# Patient Record
Sex: MALE | Race: WHITE | Employment: OTHER | URBAN - METROPOLITAN AREA
[De-identification: names, ages, dates, MRNs, and addresses within clinical notes are randomized per-mention and may not be internally consistent; named-entity substitution may affect disease eponyms.]

---

## 2020-11-20 ENCOUNTER — OFFICE VISIT (OUTPATIENT)
Dept: FAMILY MEDICINE | Facility: CLINIC | Age: 65
End: 2020-11-20
Payer: COMMERCIAL

## 2020-11-20 VITALS
RESPIRATION RATE: 16 BRPM | HEIGHT: 72 IN | WEIGHT: 195.8 LBS | DIASTOLIC BLOOD PRESSURE: 61 MMHG | SYSTOLIC BLOOD PRESSURE: 121 MMHG | TEMPERATURE: 98.1 F | BODY MASS INDEX: 26.52 KG/M2 | HEART RATE: 75 BPM | OXYGEN SATURATION: 98 %

## 2020-11-20 DIAGNOSIS — Z00.00 ROUTINE GENERAL MEDICAL EXAMINATION AT A HEALTH CARE FACILITY: Primary | ICD-10-CM

## 2020-11-20 DIAGNOSIS — K21.9 GASTROESOPHAGEAL REFLUX DISEASE WITHOUT ESOPHAGITIS: ICD-10-CM

## 2020-11-20 DIAGNOSIS — E11.9 TYPE 2 DIABETES MELLITUS WITHOUT COMPLICATION, WITH LONG-TERM CURRENT USE OF INSULIN (H): ICD-10-CM

## 2020-11-20 DIAGNOSIS — I10 ESSENTIAL HYPERTENSION: ICD-10-CM

## 2020-11-20 DIAGNOSIS — Z23 NEED FOR TETANUS BOOSTER: ICD-10-CM

## 2020-11-20 DIAGNOSIS — F17.200 TOBACCO DEPENDENCE SYNDROME: ICD-10-CM

## 2020-11-20 DIAGNOSIS — Z12.12 SCREENING FOR COLORECTAL CANCER: ICD-10-CM

## 2020-11-20 DIAGNOSIS — Z87.891 PERSONAL HISTORY OF TOBACCO USE: ICD-10-CM

## 2020-11-20 DIAGNOSIS — Z87.820 HISTORY OF TRAUMATIC BRAIN INJURY: ICD-10-CM

## 2020-11-20 DIAGNOSIS — Z79.4 TYPE 2 DIABETES MELLITUS WITHOUT COMPLICATION, WITH LONG-TERM CURRENT USE OF INSULIN (H): ICD-10-CM

## 2020-11-20 DIAGNOSIS — Z12.11 SCREENING FOR COLORECTAL CANCER: ICD-10-CM

## 2020-11-20 DIAGNOSIS — G40.909 SEIZURE DISORDER (H): ICD-10-CM

## 2020-11-20 LAB
ALBUMIN SERPL-MCNC: 4.3 G/DL (ref 3.4–5)
ALP SERPL-CCNC: 136 U/L (ref 40–150)
ALT SERPL W P-5'-P-CCNC: 51 U/L (ref 0–70)
ANION GAP SERPL CALCULATED.3IONS-SCNC: 1 MMOL/L (ref 3–14)
AST SERPL W P-5'-P-CCNC: 30 U/L (ref 0–45)
BILIRUB SERPL-MCNC: 0.5 MG/DL (ref 0.2–1.3)
BUN SERPL-MCNC: 13 MG/DL (ref 7–30)
CALCIUM SERPL-MCNC: 9.4 MG/DL (ref 8.5–10.1)
CHLORIDE SERPL-SCNC: 106 MMOL/L (ref 94–109)
CHOLEST SERPL-MCNC: 122 MG/DL
CO2 SERPL-SCNC: 32 MMOL/L (ref 20–32)
CREAT SERPL-MCNC: 1.06 MG/DL (ref 0.66–1.25)
GFR SERPL CREATININE-BSD FRML MDRD: 74 ML/MIN/{1.73_M2}
GLUCOSE SERPL-MCNC: 88 MG/DL (ref 70–99)
HBA1C MFR BLD: 5.8 % (ref 0–5.6)
HCV AB SERPL QL IA: NONREACTIVE
HDLC SERPL-MCNC: 21 MG/DL
HIV 1+2 AB+HIV1 P24 AG SERPL QL IA: NONREACTIVE
LDLC SERPL CALC-MCNC: 66 MG/DL
NONHDLC SERPL-MCNC: 101 MG/DL
POTASSIUM SERPL-SCNC: 4 MMOL/L (ref 3.4–5.3)
PROT SERPL-MCNC: 7.8 G/DL (ref 6.8–8.8)
SODIUM SERPL-SCNC: 139 MMOL/L (ref 133–144)
TRIGL SERPL-MCNC: 176 MG/DL

## 2020-11-20 PROCEDURE — 86803 HEPATITIS C AB TEST: CPT | Performed by: PHYSICIAN ASSISTANT

## 2020-11-20 PROCEDURE — 87389 HIV-1 AG W/HIV-1&-2 AB AG IA: CPT | Performed by: PHYSICIAN ASSISTANT

## 2020-11-20 PROCEDURE — 90471 IMMUNIZATION ADMIN: CPT | Performed by: PHYSICIAN ASSISTANT

## 2020-11-20 PROCEDURE — 99000 SPECIMEN HANDLING OFFICE-LAB: CPT | Performed by: PHYSICIAN ASSISTANT

## 2020-11-20 PROCEDURE — G0296 VISIT TO DETERM LDCT ELIG: HCPCS | Performed by: PHYSICIAN ASSISTANT

## 2020-11-20 PROCEDURE — 99386 PREV VISIT NEW AGE 40-64: CPT | Mod: 25 | Performed by: PHYSICIAN ASSISTANT

## 2020-11-20 PROCEDURE — 80053 COMPREHEN METABOLIC PANEL: CPT | Performed by: PHYSICIAN ASSISTANT

## 2020-11-20 PROCEDURE — 36415 COLL VENOUS BLD VENIPUNCTURE: CPT | Performed by: PHYSICIAN ASSISTANT

## 2020-11-20 PROCEDURE — 83036 HEMOGLOBIN GLYCOSYLATED A1C: CPT | Performed by: PHYSICIAN ASSISTANT

## 2020-11-20 PROCEDURE — 99213 OFFICE O/P EST LOW 20 MIN: CPT | Mod: 25 | Performed by: PHYSICIAN ASSISTANT

## 2020-11-20 PROCEDURE — 90715 TDAP VACCINE 7 YRS/> IM: CPT | Performed by: PHYSICIAN ASSISTANT

## 2020-11-20 PROCEDURE — 80175 DRUG SCREEN QUAN LAMOTRIGINE: CPT | Mod: 90 | Performed by: PHYSICIAN ASSISTANT

## 2020-11-20 PROCEDURE — 80061 LIPID PANEL: CPT | Performed by: PHYSICIAN ASSISTANT

## 2020-11-20 PROCEDURE — 80177 DRUG SCRN QUAN LEVETIRACETAM: CPT | Mod: 90 | Performed by: PHYSICIAN ASSISTANT

## 2020-11-20 RX ORDER — GLUCOSAMINE HCL/CHONDROITIN SU 500-400 MG
CAPSULE ORAL
Qty: 100 EACH | Refills: 3 | Status: SHIPPED | OUTPATIENT
Start: 2020-11-20

## 2020-11-20 RX ORDER — LANCETS
EACH MISCELLANEOUS
Qty: 1 EACH | Refills: 3 | Status: SHIPPED | OUTPATIENT
Start: 2020-11-20 | End: 2020-11-20

## 2020-11-20 RX ORDER — ATORVASTATIN CALCIUM 20 MG/1
20 TABLET, FILM COATED ORAL DAILY
COMMUNITY
End: 2020-11-20

## 2020-11-20 RX ORDER — GLUCOSAMINE HCL/CHONDROITIN SU 500-400 MG
CAPSULE ORAL
Qty: 100 EACH | Refills: 3 | Status: SHIPPED | OUTPATIENT
Start: 2020-11-20 | End: 2020-11-20

## 2020-11-20 RX ORDER — INSULIN GLARGINE 100 [IU]/ML
22 INJECTION, SOLUTION SUBCUTANEOUS
COMMUNITY
Start: 2019-05-16 | End: 2020-11-20

## 2020-11-20 RX ORDER — LAMOTRIGINE 300 MG/1
300 TABLET, EXTENDED RELEASE ORAL EVERY MORNING
COMMUNITY
End: 2023-09-27

## 2020-11-20 RX ORDER — INSULIN GLARGINE 100 [IU]/ML
22 INJECTION, SOLUTION SUBCUTANEOUS DAILY
Qty: 3 ML | Refills: 11 | Status: SHIPPED | OUTPATIENT
Start: 2020-11-20 | End: 2021-03-10

## 2020-11-20 RX ORDER — AMLODIPINE BESYLATE 10 MG/1
10 TABLET ORAL
COMMUNITY
Start: 2020-08-27 | End: 2020-11-20

## 2020-11-20 RX ORDER — ATORVASTATIN CALCIUM 20 MG/1
20 TABLET, FILM COATED ORAL DAILY
Qty: 90 TABLET | Refills: 3 | Status: SHIPPED | OUTPATIENT
Start: 2020-11-20 | End: 2021-08-30 | Stop reason: DRUGHIGH

## 2020-11-20 RX ORDER — LEVETIRACETAM 1000 MG/1
1000 TABLET ORAL 2 TIMES DAILY
COMMUNITY
Start: 2020-08-27 | End: 2023-09-27

## 2020-11-20 RX ORDER — LANCETS
EACH MISCELLANEOUS
Qty: 1 EACH | Refills: 3 | Status: SHIPPED | OUTPATIENT
Start: 2020-11-20 | End: 2021-02-15

## 2020-11-20 RX ORDER — AMLODIPINE BESYLATE 10 MG/1
10 TABLET ORAL DAILY
Qty: 90 TABLET | Refills: 3 | Status: SHIPPED | OUTPATIENT
Start: 2020-11-20 | End: 2021-11-22

## 2020-11-20 ASSESSMENT — ENCOUNTER SYMPTOMS
EYE PAIN: 0
DIZZINESS: 0
COUGH: 0
HEMATURIA: 0
CHILLS: 0
CONSTIPATION: 0
DIARRHEA: 0
NERVOUS/ANXIOUS: 0
HEMATOCHEZIA: 0
ABDOMINAL PAIN: 0

## 2020-11-20 ASSESSMENT — MIFFLIN-ST. JEOR: SCORE: 1721.14

## 2020-11-20 NOTE — PATIENT INSTRUCTIONS
To schedule the CT scan of your lungs for lung cancer screening please call [phone]    Lung Cancer Screening   Frequently Asked Questions  If you are at high-risk for lung cancer, getting screened with low-dose computed tomography (LDCT) every year can help save your life. This handout offers answers to some of the most common questions about lung cancer screening. If you have other questions, please call [phone] ([phone]).     What is it?  Lung cancer screening uses special X-ray technology to create an image of your lung tissue. The exam is quick and easy and takes less than 10 seconds. We don t give you any medicine or use any needles. You can eat before and after the exam. You don t need to change your clothes as long as the clothing on your chest doesn t contain metal. But, you do need to be able to hold your breath for at least 6 seconds during the exam.    What is the goal of lung cancer screening?  The goal of lung cancer screening is to save lives. Many times, lung cancer is not found until a person starts having physical symptoms. Lung cancer screening can help detect lung cancer in the earliest stages when it may be easier to treat.    Who should be screened for lung cancer?  We suggest lung cancer screening for anyone who is at high-risk for lung cancer. You are in the high-risk group if you:      are between the ages of 55 and 79, and    have smoked at least 1 pack of cigarettes a day for 30 or more years, and    still smoke or have quit within the past 15 years.    However, if you have a new cough or shortness of breath, you should talk to your doctor before being screened.    Some national lung health advocacy groups also recommend screening for people ages 50 to 79 who have smoked an average of 1 pack of cigarettes a day for 20 years. They must also have at least 1 other risk factor for lung cancer, not including exposure to secondhand smoke. Other risk factors are having had  cancer in the past, emphysema, pulmonary fibrosis, COPD, a family history of lung cancer, or exposure to certain materials such as arsenic, asbestos, beryllium, cadmium, chromium, diesel fumes, nickel, radon or silica. Your care team can help you know if you have one of these risk factors.     Why does it matter if I have symptoms?  Certain symptoms can be a sign that you have a condition in your lungs that should be checked and treated by your doctor. These symptoms include fever, chest pain, a new or changing cough, shortness of breath that you have never felt before, coughing up blood or unexplained weight loss. Having any of these symptoms can greatly affect the results of lung cancer screening.       Should all smokers get an LDCT lung cancer screening exam?  It depends. Lung cancer screening is for a very specific group of men and women who have a history of heavy smoking over a long period of time (see  Who should be screened for lung cancer  above).  I am in the high-risk group, but have been diagnosed with cancer in the past. Is LDCT lung cancer screening right for me?  In some cases, you should not have LDCT lung screening, such as when your doctor is already following your cancer with CT scan studies. Your doctor will help you decide if LDCT lung screening is right for you.  Do I need to have a screening exam every year?  Yes. If you are in the high-risk group described earlier, you should get an LDCT lung cancer screening exam every year until you are 79, or are no longer willing or able to undergo screening and possible procedures to diagnose and treat lung cancer.  How effective is LDCT at preventing death from lung cancer?  Studies have shown that LDCT lung cancer screening can lower the risk of death from lung cancer by 20 percent in people who are at high-risk.  What are the risks?  There are some risks and limitations of LDCT lung cancer screening. We want to make sure you understand the risks and  benefits, so please let us know if you have any questions. Your doctor may want to talk with you more about these risks.    Radiation exposure: As with any exam that uses radiation, there is a very small increased risk of cancer. The amount of radiation in LDCT is small--about the same amount a person would get from a mammogram. Your doctor orders the exam when he or she feels the potential benefits outweigh the risks.    False negatives: No test is perfect, including LDCT. It is possible that you may have a medical condition, including lung cancer, that is not found during your exam. This is called a false negative result.    False positives and more testing: LDCT very often finds something in the lung that could be cancer, but in fact is not. This is called a false positive result. False positive tests often cause anxiety. To make sure these findings are not cancer, you may need to have more tests. These tests will be done only if you give us permission. Sometimes patients need a treatment that can have side effects, such as a biopsy. For more information on false positives, see  What can I expect from the results?     Findings not related to lung cancer: Your LDCT exam also takes pictures of areas of your body next to your lungs. In a very small number of cases, the CT scan will show an abnormal finding in one of these areas, such as your kidneys, adrenal glands, liver or thyroid. This finding may not be serious, but you may need more tests. Your doctor can help you decide what other tests you may need, if any.  What can I expect from the results?  About 1 out of 4 LDCT exams will find something that may need more tests. Most of the time, these findings are lung nodules. Lung nodules are very small collections of tissue in the lung. These nodules are very common, and the vast majority--more than 97 percent--are not cancer (benign). Most are normal lymph nodes or small areas of scarring from past infections.  But,  if a small lung nodule is found to be cancer, the cancer can be cured more than 90 percent of the time. To know if the nodule is cancer, we may need to get more images before your next yearly screening exam. If the nodule has suspicious features (for example, it is large, has an odd shape or grows over time), we will refer you to a specialist for further testing.  Will my doctor also get the results?  Yes. Your doctor will get a copy of your results.  Is it okay to keep smoking now that there s a cancer screening exam?  No. Tobacco is one of the strongest cancer-causing agents. It causes not only lung cancer, but other cancers and cardiovascular (heart) diseases as well. The damage caused by smoking builds over time. This means that the longer you smoke, the higher your risk of disease. While it is never too late to quit, the sooner you quit, the better.  Where can I find help to quit smoking?  The best way to prevent lung cancer is to stop smoking. If you have already quit smoking, congratulations and keep it up! For help on quitting smoking, please call [employer] at 0-555-881-MEOB ([phone] or the American Cancer Society at 1-484.133.3692 to find local resources near you.  One-on-one health coaching:  If you d prefer to work individually with a health care provider on tobacco cessation, we offer:      Medication Therapy Management:  Our specially trained pharmacists work closely with you and your doctor to help you quit smoking.  Call [phone] or [phone] (toll free).     Can Do: Health coaching offered by [facility].  [email or web address]

## 2020-11-20 NOTE — LETTER
November 23, 2020      Karlo Townsend  [address] [address]        Dear ElbaCary,    We are writing to inform you of your test results.    Nice visiting with you this past week.   Your Keppra and Lamotrigine levels are normal.   Routine HIV and hepatitis c screening tests returned normal as well.   Your HDL (good) cholesterol is slightly low. Improve this with aerobic exercise and diet. Your triglycerides are moderately elevated. To keep triglycerides in check, watch sugar, juice, excess fruit, bread, pasta, rice and cereal. Increase exercise. Continue taking your statin (cholesterol lowering) medication as you have been.   Your comprehensive metabolic panel (kidney function, liver function, glucose, electrolytes) returned normal.   Hemoglobin A1c is at goal at 5.8%.   Please be in touch with any questions.     Resulted Orders   Hemoglobin A1c   Result Value Ref Range    Hemoglobin A1C 5.8 (H) 0 - 5.6 %      Comment:      Normal <5.7% Prediabetes 5.7-6.4%  Diabetes 6.5% or higher - adopted from ADA   consensus guidelines.     Comprehensive metabolic panel (BMP + Alb, Alk Phos, ALT, AST, Total. Bili, TP)   Result Value Ref Range    Sodium 139 133 - 144 mmol/L    Potassium 4.0 3.4 - 5.3 mmol/L    Chloride 106 94 - 109 mmol/L    Carbon Dioxide 32 20 - 32 mmol/L    Anion Gap 1 (L) 3 - 14 mmol/L    Glucose 88 70 - 99 mg/dL      Comment:      Fasting specimen    Urea Nitrogen 13 7 - 30 mg/dL    Creatinine 1.06 0.66 - 1.25 mg/dL    GFR Estimate 74 >60 mL/min/[1.73_m2]      Comment:      Non  GFR Calc  Starting 12/18/2018, serum creatinine based estimated GFR (eGFR) will be   calculated using the Chronic Kidney Disease Epidemiology Collaboration   (CKD-EPI) equation.      GFR Estimate If Black 86 >60 mL/min/[1.73_m2]      Comment:       GFR Calc  Starting 12/18/2018, serum creatinine based estimated GFR (eGFR) will be   calculated using the Chronic Kidney Disease  Epidemiology Collaboration   (CKD-EPI) equation.      Calcium 9.4 8.5 - 10.1 mg/dL    Bilirubin Total 0.5 0.2 - 1.3 mg/dL    Albumin 4.3 3.4 - 5.0 g/dL    Protein Total 7.8 6.8 - 8.8 g/dL    Alkaline Phosphatase 136 40 - 150 U/L    ALT 51 0 - 70 U/L    AST 30 0 - 45 U/L   Lipid panel reflex to direct LDL Fasting   Result Value Ref Range    Cholesterol 122 <200 mg/dL    Triglycerides 176 (H) <150 mg/dL      Comment:      Borderline high:  150-199 mg/dl  High:             200-499 mg/dl  Very high:       >499 mg/dl  Fasting specimen      HDL Cholesterol 21 (L) >39 mg/dL    LDL Cholesterol Calculated 66 <100 mg/dL      Comment:      Desirable:       <100 mg/dl    Non HDL Cholesterol 101 <130 mg/dL   Keppra (Levetiracetam) Level   Result Value Ref Range    Keppra (Levetiracetam) Level 38 12 - 46 ug/mL      Comment:      (Note)  INTERPRETIVE INFORMATION: Keppra (Levetiracetam)  Therapeutic Range:  12-46 ug/mL             Toxic:  Not well Established  Pharmacokinetics of levetiracetam are affected by renal   function. Adverse effects may include somnolence, weakness,   headache and vomiting.  This levetiracetam (Keppra) immunoassay uses the [email or web address] reagents, which has known cross-reactivity with   the drug brivaracetam (Briviact) and may report inaccurate   results. Patients transitioning from levetiracetam to   brivaracetam or those who are using both medications should   not monitor drug concentrations with the [employer]   assay. These patients should be monitored using a validated   chromatographic methodology that distinguishes between   drugs to determine drug concentrations.  Performed By: [facility]  [address] [place] [id number]  : Mindy Nagel MD     Lamotrigine Level   Result Value Ref Range    Lamotrigine Level 11.9 2.5 - 15.0 ug/mL      Comment:      (Note)  INTERPRETIVE INFORMATION:  Lamotrigine  Therapeutic Range:  2.5-15.0 ug/mL             Toxic:   Not well established  Pharmacokinetics varies widely, particularly with   co-medications and/or compromised renal function.  Adverse   effects may include dizziness, somnolence, nausea and   vomiting.  Performed By: [facility]  [address] [place] [id number]  : Mindy Nagel MD     Hepatitis C antibody   Result Value Ref Range    Hepatitis C Antibody Nonreactive NR^Nonreactive      Comment:      Assay performance characteristics have not been established for newborns,   infants, and children     HIV Antigen Antibody Combo   Result Value Ref Range    HIV Antigen Antibody Combo Nonreactive NR^Nonreactive          Comment:      HIV-1 p24 Ag & HIV-1/HIV-2 Ab Not Detected     If you have any questions or concerns, please call the clinic at the number listed above.     Sincerely,      Sagar Paris PA-C/nr

## 2020-11-21 LAB
LAMOTRIGINE SERPL-MCNC: 11.9 UG/ML (ref 2.5–15)
LEVETIRACETAM SERPL-MCNC: 38 UG/ML (ref 12–46)

## 2020-12-07 DIAGNOSIS — Z12.12 SCREENING FOR COLORECTAL CANCER: ICD-10-CM

## 2020-12-07 DIAGNOSIS — Z12.11 SCREENING FOR COLORECTAL CANCER: ICD-10-CM

## 2020-12-07 PROCEDURE — 82274 ASSAY TEST FOR BLOOD FECAL: CPT | Performed by: PHYSICIAN ASSISTANT

## 2020-12-08 LAB — HEMOCCULT STL QL IA: NEGATIVE

## 2021-02-05 ENCOUNTER — ANCILLARY PROCEDURE (OUTPATIENT)
Dept: CT IMAGING | Facility: CLINIC | Age: 66
End: 2021-02-05
Attending: PHYSICIAN ASSISTANT
Payer: COMMERCIAL

## 2021-02-05 DIAGNOSIS — F17.200 TOBACCO DEPENDENCE SYNDROME: ICD-10-CM

## 2021-02-05 PROCEDURE — 71271 CT THORAX LUNG CANCER SCR C-: CPT | Mod: GC | Performed by: RADIOLOGY

## 2021-02-08 ENCOUNTER — TELEPHONE (OUTPATIENT)
Dept: FAMILY MEDICINE | Facility: CLINIC | Age: 66
End: 2021-02-08

## 2021-02-08 NOTE — TELEPHONE ENCOUNTER
Radiology called with incidental finding of chest CT from 2/5/21:    Impression:   1. ACR Assessment Category (v1.1):  Lung-RADS Category 2. Benign  appearance or behavior.       Recommendation:  Lung-RADS Category 2. Benign appearance or behavior.  Recommendation:  continue annual screening with Lung cancer screening  CT (please order exam code [id number]).         2. Significant Incidental Finding(s):  Category S: Yes.  a.  Moderate coronary artery calcification.  3. Avoidance of tobacco smoke is strongly advised. Please consider  referral for smoking cessation to [facility] Medication Therapy Management  (MTM) if clinically appropriate.

## 2021-02-09 NOTE — TELEPHONE ENCOUNTER
Thanks for relaying the message.  I will send the patient a letter with the details and follow up recommendations.   It looks like we have an appointment coming up as well.  He was not interested in smoking cessation at last OV. Will [place] back to that topic at upcoming visit. The results of the scan may provide some additional motivation.  Thanks for your help  Sagar Paris

## 2021-02-10 ENCOUNTER — TELEPHONE (OUTPATIENT)
Dept: FAMILY MEDICINE | Facility: CLINIC | Age: 66
End: 2021-02-10

## 2021-02-10 DIAGNOSIS — E11.9 TYPE 2 DIABETES MELLITUS WITHOUT COMPLICATION, WITH LONG-TERM CURRENT USE OF INSULIN (H): ICD-10-CM

## 2021-02-10 DIAGNOSIS — Z79.4 TYPE 2 DIABETES MELLITUS WITHOUT COMPLICATION, WITH LONG-TERM CURRENT USE OF INSULIN (H): ICD-10-CM

## 2021-02-15 RX ORDER — LANCETS
EACH MISCELLANEOUS
Qty: 1 EACH | Refills: 3 | Status: SHIPPED | OUTPATIENT
Start: 2021-02-15 | End: 2021-04-30

## 2021-02-15 NOTE — TELEPHONE ENCOUNTER
[facility] does not provide such supplies. Please send rx to [facility] [address]. Needding test strips and lancets please.      [facility] - [place], [place] - [address]    Thanks  Macrina LINO

## 2021-02-19 ENCOUNTER — OFFICE VISIT (OUTPATIENT)
Dept: FAMILY MEDICINE | Facility: CLINIC | Age: 66
End: 2021-02-19
Payer: COMMERCIAL

## 2021-02-19 VITALS
SYSTOLIC BLOOD PRESSURE: 112 MMHG | BODY MASS INDEX: 26.99 KG/M2 | HEART RATE: 82 BPM | DIASTOLIC BLOOD PRESSURE: 62 MMHG | OXYGEN SATURATION: 95 % | TEMPERATURE: 98.1 F | RESPIRATION RATE: 16 BRPM | WEIGHT: 199 LBS

## 2021-02-19 DIAGNOSIS — Z79.4 TYPE 2 DIABETES MELLITUS WITHOUT COMPLICATION, WITH LONG-TERM CURRENT USE OF INSULIN (H): Primary | ICD-10-CM

## 2021-02-19 DIAGNOSIS — Z12.5 SPECIAL SCREENING FOR MALIGNANT NEOPLASM OF PROSTATE: ICD-10-CM

## 2021-02-19 DIAGNOSIS — E11.9 TYPE 2 DIABETES MELLITUS WITHOUT COMPLICATION, WITH LONG-TERM CURRENT USE OF INSULIN (H): Primary | ICD-10-CM

## 2021-02-19 LAB
HBA1C MFR BLD: 5.8 % (ref 0–5.6)
PSA SERPL-ACNC: 1.32 UG/L (ref 0–4)

## 2021-02-19 PROCEDURE — 83036 HEMOGLOBIN GLYCOSYLATED A1C: CPT | Performed by: PHYSICIAN ASSISTANT

## 2021-02-19 PROCEDURE — 36415 COLL VENOUS BLD VENIPUNCTURE: CPT | Performed by: PHYSICIAN ASSISTANT

## 2021-02-19 PROCEDURE — G0103 PSA SCREENING: HCPCS | Performed by: PHYSICIAN ASSISTANT

## 2021-02-19 PROCEDURE — 82043 UR ALBUMIN QUANTITATIVE: CPT | Performed by: PHYSICIAN ASSISTANT

## 2021-02-19 PROCEDURE — 99207 PR FOOT EXAM NO CHARGE: CPT | Performed by: PHYSICIAN ASSISTANT

## 2021-02-19 PROCEDURE — 99215 OFFICE O/P EST HI 40 MIN: CPT | Performed by: PHYSICIAN ASSISTANT

## 2021-02-19 RX ORDER — ATORVASTATIN CALCIUM 40 MG/1
40 TABLET, FILM COATED ORAL DAILY
Qty: 90 TABLET | Refills: 3 | Status: SHIPPED | OUTPATIENT
Start: 2021-02-19 | End: 2021-11-22

## 2021-02-19 NOTE — LETTER
March 1, 2021      Karlo Townsend  [address] [address]        Zohra Mcpherson visiting with you recently.   Let's review your lab work:   - Hemoglobin A1c is stable and at goal of less than 6.5%. Keep up the good work. I hope the lower dose of metformin helps with your GI symptoms.   - PSA (prosate cancer screening test) returned normal.   - Urine protein test also normal.   Please be in touch with any questions.     Resulted Orders   HEMOGLOBIN A1C   Result Value Ref Range    Hemoglobin A1C 5.8 (H) 0 - 5.6 %      Comment:      Normal <5.7% Prediabetes 5.7-6.4%  Diabetes 6.5% or higher - adopted from ADA   consensus guidelines.     Albumin Random Urine Quantitative with Creat Ratio   Result Value Ref Range    Creatinine Urine 124 mg/dL    Albumin Urine mg/L 14 mg/L    Albumin Urine mg/g Cr 10.97 0 - 17 mg/g Cr   PSA, screen   Result Value Ref Range    PSA 1.32 0 - 4 ug/L      Comment:      Assay Method:  Chemiluminescence using Siemens Vista analyzer     Sincerely,      Sagar Paris PA-C

## 2021-02-19 NOTE — PROGRESS NOTES
Assessment & Plan     Type 2 diabetes mellitus without complication, with long-term current use of insulin (H)  - Increase dose of statin to 40 mg daily given CT lung cancer screening showed moderate amount of coronary calcifications. Given GI symptoms with metformin will lower dose to 500 mg BID. His blood sugar control has been excellent.   - HEMOGLOBIN A1C  - atorvastatin (LIPITOR) 40 MG tablet; Take 1 tablet (40 mg) by mouth daily  - metFORMIN (GLUCOPHAGE) 500 MG tablet; Take 1 tablet (500 mg) by mouth 2 times daily (with meals)  - Albumin Random Urine Quantitative with Creat Ratio  - FOOT EXAM    Special screening for malignant neoplasm of prostate  - PSA, screen      40 minutes spent on the date of the encounter doing chart review, history and exam, documentation and further activities as noted above    Return in about 6 months (around 8/19/2021) for Physical Exam, Lab Work, Routine Visit.    Sagar Paris PA-C  [facility]    Natalia Dietrich is a 65 year old who presents for the following health issues     HPI   CT lung cancer screening follow up:   CT scan showed moderate coronary artery calcifications. 4 mm solid nodule RUL and trace emphysematous changes at lung apices. He is not interested in smoking cessation. Smokes 1/2 PPD.     Other: A1c has been at goal. Last checked 11/20/2020 returned 5.8%. Has been noticing some diarrhea he attributes to metformin. Wondering about dose adjustment.     Review of Systems   Constitutional, HEENT, cardiovascular, pulmonary, gi and gu systems are negative, except as otherwise noted.      Objective    /62   Pulse 82   Temp 98.1  F (36.7  C) (Oral)   Resp 16   Wt 90.3 kg (199 lb)   SpO2 95%   BMI 26.99 kg/m    Body mass index is 26.99 kg/m .  Physical Exam  Vitals signs and nursing note reviewed.   Constitutional:       Appearance: Normal appearance.   HENT:      Head: Normocephalic and atraumatic.   Eyes:       Conjunctiva/sclera: Conjunctivae normal.   Cardiovascular:      Rate and Rhythm: Normal rate and regular rhythm.      Heart sounds: Normal heart sounds.   Pulmonary:      Effort: Pulmonary effort is normal.   Feet:      Right foot:      Protective Sensation: 8 sites tested. 6 sites sensed.      Skin integrity: Skin integrity normal.      Toenail Condition: Fungal disease present.     Left foot:      Protective Sensation: 8 sites tested. 4 sites sensed.      Skin integrity: Skin integrity normal.      Toenail Condition: Fungal disease present.  Neurological:      General: No focal deficit present.      Mental Status: He is alert.   Psychiatric:         Mood and Affect: Mood normal.            Results for orders placed or performed in visit on 02/19/21 (from the past 24 hour(s))   HEMOGLOBIN A1C   Result Value Ref Range    Hemoglobin A1C 5.8 (H) 0 - 5.6 %

## 2021-02-20 LAB
CREAT UR-MCNC: 124 MG/DL
MICROALBUMIN UR-MCNC: 14 MG/L
MICROALBUMIN/CREAT UR: 10.97 MG/G CR (ref 0–17)

## 2021-04-30 DIAGNOSIS — Z79.4 TYPE 2 DIABETES MELLITUS WITHOUT COMPLICATION, WITH LONG-TERM CURRENT USE OF INSULIN (H): ICD-10-CM

## 2021-04-30 DIAGNOSIS — E11.9 TYPE 2 DIABETES MELLITUS WITHOUT COMPLICATION, WITH LONG-TERM CURRENT USE OF INSULIN (H): ICD-10-CM

## 2021-04-30 RX ORDER — LANCETS
EACH MISCELLANEOUS
Qty: 100 EACH | Refills: 3 | Status: SHIPPED | OUTPATIENT
Start: 2021-04-30 | End: 2022-07-26

## 2021-04-30 NOTE — TELEPHONE ENCOUNTER
Reason for Call:  Medication or medication refill:    Do you use a [facility]?  Name of the pharmacy and phone number for the current request:  [id number] [address]    Name of the medication requested: thin (NO BRAND SPECIFIED) lancets    Other request: Patient is requesting a refill on this medication. Please assist. Thanks!    Can we leave a detailed message on this number? YES    Phone number patient can be reached at: Cell number on file:    Telephone Information:   Mobile [phone]     Best Time: Any    Call taken on 4/30/2021 at 11:21 AM by Abbey Devine

## 2021-05-05 DIAGNOSIS — E11.9 TYPE 2 DIABETES MELLITUS WITHOUT COMPLICATION, WITH LONG-TERM CURRENT USE OF INSULIN (H): Primary | ICD-10-CM

## 2021-05-05 DIAGNOSIS — Z79.4 TYPE 2 DIABETES MELLITUS WITHOUT COMPLICATION, WITH LONG-TERM CURRENT USE OF INSULIN (H): Primary | ICD-10-CM

## 2021-07-16 DIAGNOSIS — E11.9 TYPE 2 DIABETES MELLITUS WITHOUT COMPLICATION, WITH LONG-TERM CURRENT USE OF INSULIN (H): ICD-10-CM

## 2021-07-16 DIAGNOSIS — Z79.4 TYPE 2 DIABETES MELLITUS WITHOUT COMPLICATION, WITH LONG-TERM CURRENT USE OF INSULIN (H): ICD-10-CM

## 2021-07-21 RX ORDER — FLURBIPROFEN SODIUM 0.3 MG/ML
SOLUTION/ DROPS OPHTHALMIC
Qty: 100 EACH | Refills: 1 | Status: SHIPPED | OUTPATIENT
Start: 2021-07-21 | End: 2022-02-10

## 2021-07-21 NOTE — TELEPHONE ENCOUNTER
Diabetic Supplies Protocol [id number]/16/2021 09:12 AM   Medication is active on med list Protocol Details    Patient is 18 years of age or older     Recent (6 mo) or future (30 days) visit within the authorizing provider's specialty

## 2021-08-30 ENCOUNTER — OFFICE VISIT (OUTPATIENT)
Dept: FAMILY MEDICINE | Facility: CLINIC | Age: 66
End: 2021-08-30
Payer: COMMERCIAL

## 2021-08-30 VITALS
HEIGHT: 73 IN | OXYGEN SATURATION: 97 % | WEIGHT: 193 LBS | TEMPERATURE: 97.1 F | HEART RATE: 67 BPM | RESPIRATION RATE: 16 BRPM | BODY MASS INDEX: 25.58 KG/M2 | DIASTOLIC BLOOD PRESSURE: 70 MMHG | SYSTOLIC BLOOD PRESSURE: 132 MMHG

## 2021-08-30 DIAGNOSIS — Z79.4 TYPE 2 DIABETES MELLITUS WITHOUT COMPLICATION, WITH LONG-TERM CURRENT USE OF INSULIN (H): Primary | ICD-10-CM

## 2021-08-30 DIAGNOSIS — E11.9 TYPE 2 DIABETES MELLITUS WITHOUT COMPLICATION, WITH LONG-TERM CURRENT USE OF INSULIN (H): Primary | ICD-10-CM

## 2021-08-30 LAB — HBA1C MFR BLD: 5.9 % (ref 0–5.6)

## 2021-08-30 PROCEDURE — 83036 HEMOGLOBIN GLYCOSYLATED A1C: CPT | Performed by: PHYSICIAN ASSISTANT

## 2021-08-30 PROCEDURE — 36415 COLL VENOUS BLD VENIPUNCTURE: CPT | Performed by: PHYSICIAN ASSISTANT

## 2021-08-30 PROCEDURE — 99213 OFFICE O/P EST LOW 20 MIN: CPT | Performed by: PHYSICIAN ASSISTANT

## 2021-08-30 ASSESSMENT — MIFFLIN-ST. JEOR: SCORE: 1719.32

## 2021-08-30 NOTE — LETTER
August 30, 2021      Karlo Townsend  [address] [address]        Dear ,    We are writing to inform you of your test results.    Your A1c level is stable at 5.9%.   Please continue to work on healthy diet and regular exercise.   If you decide you want to quit smoking please let us know.   Follow up as planned with Dr. Nguyen later this year.   Thanks for being a wonderful patient.   Best wishes,   Sagar Bynum Orders   Hemoglobin A1c   Result Value Ref Range    Hemoglobin A1C 5.9 (H) 0.0 - 5.6 %      Comment:      Normal <5.7%   Prediabetes 5.7-6.4%    Diabetes 6.5% or higher     Note: Adopted from ADA consensus guidelines.       If you have any questions or concerns, please call the clinic at the number listed above.       Sincerely,      Sagar Paris PA-C

## 2021-08-30 NOTE — PROGRESS NOTES
"    Assessment & Plan     Type 2 diabetes mellitus without complication, with long-term current use of insulin (H)  Chronic, stable, at goal. He is bothered by GI side effects with metformin. Requesting discontinuation of the medication today. He continues to use Lantus 22 units daily. A1c today 5.9%. Will oblige request and stop Metformin. We did discuss switching to XR form but he would like to try stopping entirely. He is to follow up with Dr. Nguyen in 3 months for recheck as I will be transitioning out of primary care. Discussed need for eye exam -- he is due. He will follow up on this. Also discussed smoking cessation but Karlo is not interested in this at this time. He understands increased risk for adverse events such as heart disease and stroke. Blood pressure is well controlled today. Encouraged healthy diet and regular exercise.   - Adult Eye Referral; Future  - Hemoglobin A1c; Future  - Hemoglobin A1c       Tobacco Cessation:   reports that he has been smoking. He has never used smokeless tobacco.  Tobacco Cessation Action Plan: Information offered: Patient not interested at this time    BMI:   Estimated body mass index is 25.46 kg/m  as calculated from the following:    Height as of this encounter: 1.854 m (6' 1\").    Weight as of this encounter: 87.5 kg (193 lb).   Weight management plan: Discussed healthy diet and exercise guidelines    Return in about 3 months (around 11/30/2021) for Routine Visit.    Sagar Paris PA-C  [facility]    Natalia Dietrich is a 66 year old who presents for the following health issues     HPI     Diabetes Follow-up  How often are you checking your blood sugar? One time daily  What time of day are you checking your blood sugars (select all that apply)?  Before meals  Have you had any blood sugars above 200?  No  Have you had any blood sugars below 70?  No    What symptoms do you notice when your blood sugar is low?  None and Not " "applicable    What concerns do you have today about your diabetes? None and Other: medication      Do you have any of these symptoms? (Select all that apply)  No numbness or tingling in feet.  No redness, sores or blisters on feet.  No complaints of excessive thirst.  No reports of blurry vision.  No significant changes to weight.    Have you had a diabetic eye exam in the last 12 months? No    Metformin - decreased dose - helped a little bit but still with loose stools. Less frequent now with lower dose but still bothersome.     Exercise - going for walks, goes up and down stairs a lot. Lives on top floor of building.   Reports has been maintaining weight.   Diet limits sugar - meals are cooked for him - continues with prilosec and tries to avoid spicy and greasy foods.     Lantus 22 once daily   Checking sugars in the morning typically 100-110   No low sugar readings     1/2 PPD smoker     Eye exam - declines     BP Readings from Last 2 Encounters:   08/30/21 132/70   02/19/21 112/62     Hemoglobin A1C (%)   Date Value   08/30/2021 5.9 (H)   02/19/2021 5.8 (H)   11/20/2020 5.8 (H)     LDL Cholesterol Calculated (mg/dL)   Date Value   11/20/2020 66           How many servings of fruits and vegetables do you eat daily?  0-1    On average, how many sweetened beverages do you drink each day (Examples: soda, juice, sweet tea, etc.  Do NOT count diet or artificially sweetened beverages)?   0    How many days per week do you exercise enough to make your heart beat faster? 7    How many minutes a day do you exercise enough to make your heart beat faster? 20 - 29    How many days per week do you miss taking your medication? 0        Review of Systems   Constitutional, HEENT, cardiovascular, pulmonary, gi and gu systems are negative, except as otherwise noted.      Objective    /70   Pulse 67   Temp 97.1  F (36.2  C) (Oral)   Resp 16   Ht 1.854 m (6' 1\")   Wt 87.5 kg (193 lb)   SpO2 97%   BMI 25.46 kg/m    Body " mass index is 25.46 kg/m .  Physical Exam       Results for orders placed or performed in visit on 08/30/21   Hemoglobin A1c     Status: Abnormal   Result Value Ref Range    Hemoglobin A1C 5.9 (H) 0.0 - 5.6 %

## 2021-08-30 NOTE — LETTER
August 31, 2021      Karlo Townsend  [address] [facility] [address]        Hi Karlo,     Your A1c level is stable at 5.9%.   Please continue to work on healthy diet and regular exercise.   If you decide you want to quit smoking please let us know.   Follow up as planned with Dr. Nguyen later this year.     Resulted Orders   Hemoglobin A1c   Result Value Ref Range    Hemoglobin A1C 5.9 (H) 0.0 - 5.6 %      Comment:      Normal <5.7%   Prediabetes 5.7-6.4%    Diabetes 6.5% or higher     Note: Adopted from ADA consensus guidelines.       Thanks for being a wonderful patient.     Best wishes,   Sagar Paris

## 2021-09-23 ENCOUNTER — OFFICE VISIT (OUTPATIENT)
Dept: OPHTHALMOLOGY | Facility: CLINIC | Age: 66
End: 2021-09-23
Attending: PHYSICIAN ASSISTANT
Payer: COMMERCIAL

## 2021-09-23 DIAGNOSIS — H52.7 REFRACTIVE ERROR: ICD-10-CM

## 2021-09-23 DIAGNOSIS — H25.13 NUCLEAR SCLEROTIC CATARACT OF BOTH EYES: ICD-10-CM

## 2021-09-23 DIAGNOSIS — E11.9 TYPE 2 DIABETES MELLITUS WITHOUT COMPLICATION, WITH LONG-TERM CURRENT USE OF INSULIN (H): Primary | ICD-10-CM

## 2021-09-23 DIAGNOSIS — Z79.4 TYPE 2 DIABETES MELLITUS WITHOUT COMPLICATION, WITH LONG-TERM CURRENT USE OF INSULIN (H): Primary | ICD-10-CM

## 2021-09-23 PROCEDURE — G0463 HOSPITAL OUTPT CLINIC VISIT: HCPCS | Mod: 25

## 2021-09-23 PROCEDURE — 92015 DETERMINE REFRACTIVE STATE: CPT

## 2021-09-23 PROCEDURE — 92004 COMPRE OPH EXAM NEW PT 1/>: CPT | Performed by: OPHTHALMOLOGY

## 2021-09-23 ASSESSMENT — CONF VISUAL FIELD
METHOD: COUNTING FINGERS
OS_NORMAL: 1
OD_NORMAL: 1

## 2021-09-23 ASSESSMENT — VISUAL ACUITY
METHOD_MR_RETINOSCOPY: 1
OS_PH_SC: 20/30
OD_PH_SC: 20/60
OS_SC: 20/40
OD_SC: 20/70
METHOD: SNELLEN - LINEAR

## 2021-09-23 ASSESSMENT — REFRACTION_MANIFEST
OS_AXIS: 007
OS_CYLINDER: +0.75
OD_AXIS: 175
OD_CYLINDER: +1.00
OD_ADD: +2.50
OS_ADD: +2.50
OS_SPHERE: PLANO
OD_SPHERE: -0.75

## 2021-09-23 ASSESSMENT — SLIT LAMP EXAM - LIDS
COMMENTS: DERMATOCHALASIS
COMMENTS: DERMATOCHALASIS

## 2021-09-23 ASSESSMENT — EXTERNAL EXAM - LEFT EYE: OS_EXAM: NORMAL

## 2021-09-23 ASSESSMENT — CUP TO DISC RATIO
OD_RATIO: 0.2
OS_RATIO: 0.2

## 2021-09-23 ASSESSMENT — TONOMETRY
IOP_METHOD: TONOPEN
OD_IOP_MMHG: 13
OS_IOP_MMHG: 13

## 2021-09-23 ASSESSMENT — EXTERNAL EXAM - RIGHT EYE: OD_EXAM: NORMAL

## 2021-09-23 NOTE — PROGRESS NOTES
Chief Complaint(s) and History of Present Illness(es)     Diabetic Eye Exam Follow Up     Associated symptoms: Negative for photophobia, floaters, flashes and   tearing    Diabetes Type: Type 2    Blood Sugars: is controlled    Treatments tried: no treatments    Pain scale: 0/10    Comments: Type 2 diabetes mellitus without complication, with long-term   current use of insulin               Comments     Pt here for Diabetic eye exam  LBS:  83    Last A1C: 5.9  Lab Results       Component                Value               Date                       A1C                      5.9                 08/30/2021                 A1C                      5.8                 02/19/2021                 A1C                      5.8                 11/20/2020       Lexi Alfred COT 9:03 AM September 23, 2021                        Review of systems for the eyes was negative other than the pertinent positives/negatives listed in the HPI.      Assessment & Plan      Karlo Townsend is a 66 year old male with the following diagnoses:   1. Type 2 diabetes mellitus without complication, with long-term current use of insulin (H)    2. Nuclear sclerotic cataract of both eyes    3. Refractive error           New patient here with aid from nursing home  Well controlled diabetes mellitus   No retinopathy  Stressed good glycemic and hypertensive control  Monitor yearly     Refractive options reviewed  Refraction given  Option to fill       Patient disposition:   Return in about 1 year (around 9/23/2022) for DFE, optometry clinic.           Attending Physician Attestation:  Complete documentation of historical and exam elements from today's encounter can be found in the full encounter summary report (not reduplicated in this progress note).  I personally obtained the chief complaint(s) and history of present illness.  I confirmed and edited as necessary the review of systems, past medical/surgical history, family history, social history,  and examination findings as documented by others; and I examined the patient myself.  I personally reviewed the relevant tests, images, and reports as documented above.  I formulated and edited as necessary the assessment and plan and discussed the findings and management plan with the patient and family. . - Tigre Alfred MD

## 2021-10-14 ENCOUNTER — TELEPHONE (OUTPATIENT)
Dept: FAMILY MEDICINE | Facility: CLINIC | Age: 66
End: 2021-10-14

## 2021-10-14 DIAGNOSIS — E11.9 TYPE 2 DIABETES MELLITUS WITHOUT COMPLICATION, WITH LONG-TERM CURRENT USE OF INSULIN (H): ICD-10-CM

## 2021-10-14 DIAGNOSIS — Z79.4 TYPE 2 DIABETES MELLITUS WITHOUT COMPLICATION, WITH LONG-TERM CURRENT USE OF INSULIN (H): ICD-10-CM

## 2021-10-14 RX ORDER — BLOOD SUGAR DIAGNOSTIC
STRIP MISCELLANEOUS
Qty: 200 STRIP | Refills: 3 | Status: SHIPPED | OUTPATIENT
Start: 2021-10-14 | End: 2022-11-29

## 2021-10-14 NOTE — TELEPHONE ENCOUNTER
Patient called RN triage for refill of glucose test strips. See refill request from today (10/14/21)    JOSSY Oliveira RN  [facility]

## 2021-11-22 ENCOUNTER — OFFICE VISIT (OUTPATIENT)
Dept: FAMILY MEDICINE | Facility: CLINIC | Age: 66
End: 2021-11-22
Payer: COMMERCIAL

## 2021-11-22 VITALS
HEIGHT: 72 IN | HEART RATE: 80 BPM | DIASTOLIC BLOOD PRESSURE: 74 MMHG | SYSTOLIC BLOOD PRESSURE: 138 MMHG | OXYGEN SATURATION: 98 % | WEIGHT: 195 LBS | BODY MASS INDEX: 26.41 KG/M2 | TEMPERATURE: 98.1 F

## 2021-11-22 DIAGNOSIS — G40.909 SEIZURE DISORDER (H): ICD-10-CM

## 2021-11-22 DIAGNOSIS — I10 ESSENTIAL HYPERTENSION: ICD-10-CM

## 2021-11-22 DIAGNOSIS — Z87.891 PERSONAL HISTORY OF TOBACCO USE: ICD-10-CM

## 2021-11-22 DIAGNOSIS — Z23 HIGH PRIORITY FOR 2019-NCOV VACCINE: ICD-10-CM

## 2021-11-22 DIAGNOSIS — Z00.00 ROUTINE GENERAL MEDICAL EXAMINATION AT A HEALTH CARE FACILITY: Primary | ICD-10-CM

## 2021-11-22 DIAGNOSIS — E11.9 TYPE 2 DIABETES MELLITUS WITHOUT COMPLICATION, WITH LONG-TERM CURRENT USE OF INSULIN (H): ICD-10-CM

## 2021-11-22 DIAGNOSIS — Z12.11 SCREEN FOR COLON CANCER: ICD-10-CM

## 2021-11-22 DIAGNOSIS — Z87.820 HISTORY OF TRAUMATIC BRAIN INJURY: ICD-10-CM

## 2021-11-22 DIAGNOSIS — K21.9 GASTROESOPHAGEAL REFLUX DISEASE WITHOUT ESOPHAGITIS: ICD-10-CM

## 2021-11-22 DIAGNOSIS — Z79.4 TYPE 2 DIABETES MELLITUS WITHOUT COMPLICATION, WITH LONG-TERM CURRENT USE OF INSULIN (H): ICD-10-CM

## 2021-11-22 LAB
ANION GAP SERPL CALCULATED.3IONS-SCNC: 7 MMOL/L (ref 3–14)
BUN SERPL-MCNC: 13 MG/DL (ref 7–30)
CALCIUM SERPL-MCNC: 9.3 MG/DL (ref 8.5–10.1)
CHLORIDE BLD-SCNC: 104 MMOL/L (ref 94–109)
CHOLEST SERPL-MCNC: 107 MG/DL
CO2 SERPL-SCNC: 28 MMOL/L (ref 20–32)
CREAT SERPL-MCNC: 1.09 MG/DL (ref 0.66–1.25)
CREAT UR-MCNC: 52 MG/DL
FASTING STATUS PATIENT QL REPORTED: YES
GFR SERPL CREATININE-BSD FRML MDRD: 71 ML/MIN/1.73M2
GLUCOSE BLD-MCNC: 93 MG/DL (ref 70–99)
HBA1C MFR BLD: 6.3 % (ref 0–5.6)
HDLC SERPL-MCNC: 21 MG/DL
LDLC SERPL CALC-MCNC: 48 MG/DL
MICROALBUMIN UR-MCNC: 61 MG/L
MICROALBUMIN/CREAT UR: 117.31 MG/G CR (ref 0–17)
NONHDLC SERPL-MCNC: 86 MG/DL
POTASSIUM BLD-SCNC: 4 MMOL/L (ref 3.4–5.3)
SODIUM SERPL-SCNC: 139 MMOL/L (ref 133–144)
TRIGL SERPL-MCNC: 189 MG/DL
VIT B12 SERPL-MCNC: 457 PG/ML (ref 193–986)

## 2021-11-22 PROCEDURE — 80048 BASIC METABOLIC PNL TOTAL CA: CPT | Performed by: FAMILY MEDICINE

## 2021-11-22 PROCEDURE — 80175 DRUG SCREEN QUAN LAMOTRIGINE: CPT | Mod: 90 | Performed by: FAMILY MEDICINE

## 2021-11-22 PROCEDURE — 0064A COVID-19,PF,MODERNA (18+ YRS BOOSTER .25ML): CPT | Performed by: FAMILY MEDICINE

## 2021-11-22 PROCEDURE — 82607 VITAMIN B-12: CPT | Performed by: FAMILY MEDICINE

## 2021-11-22 PROCEDURE — 99396 PREV VISIT EST AGE 40-64: CPT | Mod: 25 | Performed by: FAMILY MEDICINE

## 2021-11-22 PROCEDURE — 91306 COVID-19,PF,MODERNA (18+ YRS BOOSTER .25ML): CPT | Performed by: FAMILY MEDICINE

## 2021-11-22 PROCEDURE — 99214 OFFICE O/P EST MOD 30 MIN: CPT | Mod: 25 | Performed by: FAMILY MEDICINE

## 2021-11-22 PROCEDURE — 80177 DRUG SCRN QUAN LEVETIRACETAM: CPT | Mod: 90 | Performed by: FAMILY MEDICINE

## 2021-11-22 PROCEDURE — 36415 COLL VENOUS BLD VENIPUNCTURE: CPT | Performed by: FAMILY MEDICINE

## 2021-11-22 PROCEDURE — 82043 UR ALBUMIN QUANTITATIVE: CPT | Performed by: FAMILY MEDICINE

## 2021-11-22 PROCEDURE — 80061 LIPID PANEL: CPT | Performed by: FAMILY MEDICINE

## 2021-11-22 PROCEDURE — 83036 HEMOGLOBIN GLYCOSYLATED A1C: CPT | Performed by: FAMILY MEDICINE

## 2021-11-22 PROCEDURE — 99000 SPECIMEN HANDLING OFFICE-LAB: CPT | Performed by: FAMILY MEDICINE

## 2021-11-22 RX ORDER — ATORVASTATIN CALCIUM 40 MG/1
40 TABLET, FILM COATED ORAL DAILY
Qty: 90 TABLET | Refills: 3 | Status: SHIPPED | OUTPATIENT
Start: 2022-01-22 | End: 2022-11-07

## 2021-11-22 RX ORDER — AMLODIPINE BESYLATE 10 MG/1
10 TABLET ORAL DAILY
Qty: 90 TABLET | Refills: 3 | Status: SHIPPED | OUTPATIENT
Start: 2021-11-22 | End: 2022-11-29

## 2021-11-22 ASSESSMENT — ENCOUNTER SYMPTOMS
HEMATOCHEZIA: 0
HEMATURIA: 0
CHILLS: 0
ABDOMINAL PAIN: 0

## 2021-11-22 ASSESSMENT — MIFFLIN-ST. JEOR: SCORE: 1716.48

## 2021-11-22 NOTE — NURSING NOTE
Prior to immunization administration, verified patients identity using patient s name and date of birth. Please see Immunization Activity for additional information.     Screening Questionnaire for Adult Immunization    Are you sick today?   No   Do you have allergies to medications, food, a vaccine component or latex?   No   Have you ever had a serious reaction after receiving a vaccination?   No   Do you have a long-term health problem with heart, lung, kidney, or metabolic disease (e.g., diabetes), asthma, a blood disorder, no spleen, complement component deficiency, a cochlear implant, or a spinal fluid leak?  Are you on long-term aspirin therapy?   No   Do you have cancer, leukemia, HIV/AIDS, or any other immune system problem?   No   Do you have a parent, brother, or sister with an immune system problem?   No   In the past 3 months, have you taken medications that affect  your immune system, such as prednisone, other steroids, or anticancer drugs; drugs for the treatment of rheumatoid arthritis, Crohn s disease, or psoriasis; or have you had radiation treatments?   No   Have you had a seizure, or a brain or other nervous system problem?   No   During the past year, have you received a transfusion of blood or blood    products, or been given immune (gamma) globulin or antiviral drug?   No   For women: Are you pregnant or is there a chance you could become       pregnant during the next month?   No   Have you received any vaccinations in the past 4 weeks?   No     Immunization questionnaire answers were all negative.        Per orders of Dr. Nguyen, injection of moderna booster given by Danielle Schuster. Patient instructed to remain in clinic for 15 minutes afterwards, and to report any adverse reaction to me immediately.       Screening performed by Danielle Schuster on 11/22/2021 at 9:40 AM.

## 2021-11-22 NOTE — LETTER
December 1, 2021      Karlo Townsend  [address] [facility] [address]        Dear ,    We are writing to inform you of your test results.    Your Keppra and Lamictal level results. Please discuss these results with your neurologist.Urine test of leaking protein shows some leaking of protein -avoid ibuprofen, aspirin type of drugs. Vitamin B12 level is within normal limit. Kidney function is within normal limit. A1c is slightly worse than the last time and better control of blood sugar can help. Avoid excessive plain carbs in diet. Good cholesterol HDL is low which improves with exercise.       Resulted Orders   Lipid panel reflex to direct LDL Fasting   Result Value Ref Range    Cholesterol 107 <200 mg/dL    Triglycerides 189 (H) <150 mg/dL    Direct Measure HDL 21 (L) >=40 mg/dL    LDL Cholesterol Calculated 48 <=100 mg/dL    Non HDL Cholesterol 86 <130 mg/dL    Patient Fasting > 8hrs? Yes     Narrative    Cholesterol  Desirable:  <200 mg/dL    Triglycerides  Normal:  Less than 150 mg/dL  Borderline High:  150-199 mg/dL  High:  200-499 mg/dL  Very High:  Greater than or equal to 500 mg/dL    Direct Measure HDL  Female:  Greater than or equal to 50 mg/dL   Male:  Greater than or equal to 40 mg/dL    LDL Cholesterol  Desirable:  <100mg/dL  Above Desirable:  100-129 mg/dL   Borderline High:  130-159 mg/dL   High:  160-189 mg/dL   Very High:  >= 190 mg/dL    Non HDL Cholesterol  Desirable:  130 mg/dL  Above Desirable:  130-159 mg/dL  Borderline High:  160-189 mg/dL  High:  190-219 mg/dL  Very High:  Greater than or equal to 220 mg/dL   BASIC METABOLIC PANEL   Result Value Ref Range    Sodium 139 133 - 144 mmol/L    Potassium 4.0 3.4 - 5.3 mmol/L    Chloride 104 94 - 109 mmol/L    Carbon Dioxide (CO2) 28 20 - 32 mmol/L    Anion Gap 7 3 - 14 mmol/L    Urea Nitrogen 13 7 - 30 mg/dL    Creatinine 1.09 0.66 - 1.25 mg/dL    Calcium 9.3 8.5 - 10.1 mg/dL    Glucose 93 70 - 99 mg/dL    GFR Estimate 71  >60 mL/min/1.73m2      Comment:      As of July 11, 2021, eGFR is calculated by the CKD-EPI creatinine equation, without race adjustment. eGFR can be influenced by muscle mass, exercise, and diet. The reported eGFR is an estimation only and is only applicable if the renal function is stable.   Hemoglobin A1c   Result Value Ref Range    Hemoglobin A1C 6.3 (H) 0.0 - 5.6 %      Comment:      Normal <5.7%   Prediabetes 5.7-6.4%    Diabetes 6.5% or higher     Note: Adopted from ADA consensus guidelines.   Vitamin B12   Result Value Ref Range    Vitamin B12 457 193 - 986 pg/mL   Lamotrigine Level   Result Value Ref Range    Lamotrigine 7.4 2.5 - 15.0 ug/mL      Comment:      INTERPRETIVE INFORMATION:  Lamotrigine    Therapeutic Range:  2.5-15.0 ug/mL              Toxic:  Not well established    Pharmacokinetics varies widely, particularly with   co-medications and/or compromised renal function.  Adverse   effects may include dizziness, somnolence, nausea and   vomiting.    Narrative    Performed By: [facility]  [address] [place] [id number]  : Mindy Nagel MD   Keppra (Levetiracetam) Level   Result Value Ref Range    Keppra (Levetiracetam) Level 17 12 - 46 ug/mL      Comment:      INTERPRETIVE INFORMATION: Keppra (Levetiracetam)    Therapeutic Range:  12-46 ug/mL              Toxic:  Not well Established    Pharmacokinetics of levetiracetam are affected by renal   function. Adverse effects may include somnolence, weakness,   headache and vomiting.    This levetiracetam (Keppra) immunoassay uses the [employer] reagents, which has known cross-reactivity with   the drug brivaracetam (Briviact) and may report inaccurate   results. Patients transitioning from levetiracetam to   brivaracetam or those who are using both medications should   not monitor drug concentrations with the [employer]   assay. These patients should be monitored using a validated   chromatographic  methodology that distinguishes between   drugs to determine drug concentrations.    Narrative    Performed By: [facility]  [address] [place] [id number]  : Mindy Nagel MD   Albumin Random Urine Quantitative with Creat Ratio   Result Value Ref Range    Creatinine Urine mg/dL 52 mg/dL    Albumin Urine mg/L 61 mg/L    Albumin Urine mg/g Cr 117.31 (H) 0.00 - 17.00 mg/g Cr       If you have any questions or concerns, please call the clinic at the number listed above.       Sincerely,      Reji Nguyen MD

## 2021-11-22 NOTE — LETTER
December 1, 2021      Karlo Townsend  [address] [facility] [address]        Dear ,    We are writing to inform you of your test results.    Your Keppra and Lamictal level results. Please discuss these results with your neurologist.Urine test of leaking protein shows some leaking of protein -avoid ibuprofen, aspirin type of drugs. Vitamin B12 level is within normal limit. Kidney function is within normal limit. A1c is slightly worse than the last time and better control of blood sugar can help. Avoid excessive plain carbs in diet. Good cholesterol HDL is low which improves with exercise.       Resulted Orders   Lipid panel reflex to direct LDL Fasting   Result Value Ref Range    Cholesterol 107 <200 mg/dL    Triglycerides 189 (H) <150 mg/dL    Direct Measure HDL 21 (L) >=40 mg/dL    LDL Cholesterol Calculated 48 <=100 mg/dL    Non HDL Cholesterol 86 <130 mg/dL    Patient Fasting > 8hrs? Yes     Narrative    Cholesterol  Desirable:  <200 mg/dL    Triglycerides  Normal:  Less than 150 mg/dL  Borderline High:  150-199 mg/dL  High:  200-499 mg/dL  Very High:  Greater than or equal to 500 mg/dL    Direct Measure HDL  Female:  Greater than or equal to 50 mg/dL   Male:  Greater than or equal to 40 mg/dL    LDL Cholesterol  Desirable:  <100mg/dL  Above Desirable:  100-129 mg/dL   Borderline High:  130-159 mg/dL   High:  160-189 mg/dL   Very High:  >= 190 mg/dL    Non HDL Cholesterol  Desirable:  130 mg/dL  Above Desirable:  130-159 mg/dL  Borderline High:  160-189 mg/dL  High:  190-219 mg/dL  Very High:  Greater than or equal to 220 mg/dL   BASIC METABOLIC PANEL   Result Value Ref Range    Sodium 139 133 - 144 mmol/L    Potassium 4.0 3.4 - 5.3 mmol/L    Chloride 104 94 - 109 mmol/L    Carbon Dioxide (CO2) 28 20 - 32 mmol/L    Anion Gap 7 3 - 14 mmol/L    Urea Nitrogen 13 7 - 30 mg/dL    Creatinine 1.09 0.66 - 1.25 mg/dL    Calcium 9.3 8.5 - 10.1 mg/dL    Glucose 93 70 - 99 mg/dL    GFR Estimate 71  >60 mL/min/1.73m2      Comment:      As of July 11, 2021, eGFR is calculated by the CKD-EPI creatinine equation, without race adjustment. eGFR can be influenced by muscle mass, exercise, and diet. The reported eGFR is an estimation only and is only applicable if the renal function is stable.   Hemoglobin A1c   Result Value Ref Range    Hemoglobin A1C 6.3 (H) 0.0 - 5.6 %      Comment:      Normal <5.7%   Prediabetes 5.7-6.4%    Diabetes 6.5% or higher     Note: Adopted from ADA consensus guidelines.   Vitamin B12   Result Value Ref Range    Vitamin B12 457 193 - 986 pg/mL   Lamotrigine Level   Result Value Ref Range    Lamotrigine 7.4 2.5 - 15.0 ug/mL      Comment:      INTERPRETIVE INFORMATION:  Lamotrigine    Therapeutic Range:  2.5-15.0 ug/mL              Toxic:  Not well established    Pharmacokinetics varies widely, particularly with   co-medications and/or compromised renal function.  Adverse   effects may include dizziness, somnolence, nausea and   vomiting.    Narrative    Performed By: [facility]  [address] [place] [id number]  : Mindy Nagel MD   Keppra (Levetiracetam) Level   Result Value Ref Range    Keppra (Levetiracetam) Level 17 12 - 46 ug/mL      Comment:      INTERPRETIVE INFORMATION: Keppra (Levetiracetam)    Therapeutic Range:  12-46 ug/mL              Toxic:  Not well Established    Pharmacokinetics of levetiracetam are affected by renal   function. Adverse effects may include somnolence, weakness,   headache and vomiting.    This levetiracetam (Keppra) immunoassay uses the "[employer] reagents, which has known cross-reactivity with   the drug brivaracetam (Briviact) and may report inaccurate   results. Patients transitioning from levetiracetam to   brivaracetam or those who are using both medications should   not monitor drug concentrations with the [employer]   assay. These patients should be monitored using a validated   chromatographic  methodology that distinguishes between   drugs to determine drug concentrations.    Narrative    Performed By: [facility]  [address] [place] [id number]  : Mindy Nagel MD   Albumin Random Urine Quantitative with Creat Ratio   Result Value Ref Range    Creatinine Urine mg/dL 52 mg/dL    Albumin Urine mg/L 61 mg/L    Albumin Urine mg/g Cr 117.31 (H) 0.00 - 17.00 mg/g Cr       If you have any questions or concerns, please call the clinic at the number listed above.       Sincerely,      Reji Nguyen MD

## 2021-11-22 NOTE — PATIENT INSTRUCTIONS
Preventive Health Recommendations  Male Ages 50 - 64    Yearly exam:             See your health care provider every year in order to  o   Review health changes.   o   Discuss preventive care.    o   Review your medicines if your doctor has prescribed any.     Have a cholesterol test every 5 years, or more frequently if you are at risk for high cholesterol/heart disease.     Have a diabetes test (fasting glucose) every three years. If you are at risk for diabetes, you should have this test more often.     Have a colonoscopy at age 50, or have a yearly FIT test (stool test). These exams will check for colon cancer.      Talk with your health care provider about whether or not a prostate cancer screening test (PSA) is right for you.    You should be tested each year for STDs (sexually transmitted diseases), if you re at risk.     Shots: Get a flu shot each year. Get a tetanus shot every 10 years.     Nutrition:    Eat at least 5 servings of fruits and vegetables daily.     Eat whole-grain bread, whole-wheat pasta and brown rice instead of white grains and rice.     Get adequate Calcium and Vitamin D.     Lifestyle    Exercise for at least 150 minutes a week (30 minutes a day, 5 days a week). This will help you control your weight and prevent disease.     Limit alcohol to one drink per day.     No smoking.     Wear sunscreen to prevent skin cancer.     See your dentist every six months for an exam and cleaning.     See your eye doctor every 1 to 2 years.    Lung Cancer Screening   Frequently Asked Questions  If you are at high-risk for lung cancer, getting screened with low-dose computed tomography (LDCT) every year can help save your life. This handout offers answers to some of the most common questions about lung cancer screening. If you have other questions, please call [phone] ([phone]).     What is it?  Lung cancer screening uses special X-ray technology to create an image of your lung tissue.  The exam is quick and easy and takes less than 10 seconds. We don t give you any medicine or use any needles. You can eat before and after the exam. You don t need to change your clothes as long as the clothing on your chest doesn t contain metal. But, you do need to be able to hold your breath for at least 6 seconds during the exam.    What is the goal of lung cancer screening?  The goal of lung cancer screening is to save lives. Many times, lung cancer is not found until a person starts having physical symptoms. Lung cancer screening can help detect lung cancer in the earliest stages when it may be easier to treat.    Who should be screened for lung cancer?  We suggest lung cancer screening for anyone who is at high-risk for lung cancer. You are in the high-risk group if you:      are between the ages of 55 and 79, and    have smoked at least 1 pack of cigarettes a day for 30 or more years, and    still smoke or have quit within the past 15 years.    However, if you have a new cough or shortness of breath, you should talk to your doctor before being screened.    Some national lung health advocacy groups also recommend screening for people ages 50 to 79 who have smoked an average of 1 pack of cigarettes a day for 20 years. They must also have at least 1 other risk factor for lung cancer, not including exposure to secondhand smoke. Other risk factors are having had cancer in the past, emphysema, pulmonary fibrosis, COPD, a family history of lung cancer, or exposure to certain materials such as arsenic, asbestos, beryllium, cadmium, chromium, diesel fumes, nickel, radon or silica. Your care team can help you know if you have one of these risk factors.     Why does it matter if I have symptoms?  Certain symptoms can be a sign that you have a condition in your lungs that should be checked and treated by your doctor. These symptoms include fever, chest pain, a new or changing cough, shortness of breath that you have  never felt before, coughing up blood or unexplained weight loss. Having any of these symptoms can greatly affect the results of lung cancer screening.       Should all smokers get an LDCT lung cancer screening exam?  It depends. Lung cancer screening is for a very specific group of men and women who have a history of heavy smoking over a long period of time (see  Who should be screened for lung cancer  above).  I am in the high-risk group, but have been diagnosed with cancer in the past. Is LDCT lung cancer screening right for me?  In some cases, you should not have LDCT lung screening, such as when your doctor is already following your cancer with CT scan studies. Your doctor will help you decide if LDCT lung screening is right for you.  Do I need to have a screening exam every year?  Yes. If you are in the high-risk group described earlier, you should get an LDCT lung cancer screening exam every year until you are 79, or are no longer willing or able to undergo screening and possible procedures to diagnose and treat lung cancer.  How effective is LDCT at preventing death from lung cancer?  Studies have shown that LDCT lung cancer screening can lower the risk of death from lung cancer by 20 percent in people who are at high-risk.  What are the risks?  There are some risks and limitations of LDCT lung cancer screening. We want to make sure you understand the risks and benefits, so please let us know if you have any questions. Your doctor may want to talk with you more about these risks.    Radiation exposure: As with any exam that uses radiation, there is a very small increased risk of cancer. The amount of radiation in LDCT is small--about the same amount a person would get from a mammogram. Your doctor orders the exam when he or she feels the potential benefits outweigh the risks.    False negatives: No test is perfect, including LDCT. It is possible that you may have a medical condition, including lung cancer,  that is not found during your exam. This is called a false negative result.    False positives and more testing: LDCT very often finds something in the lung that could be cancer, but in fact is not. This is called a false positive result. False positive tests often cause anxiety. To make sure these findings are not cancer, you may need to have more tests. These tests will be done only if you give us permission. Sometimes patients need a treatment that can have side effects, such as a biopsy. For more information on false positives, see  What can I expect from the results?     Findings not related to lung cancer: Your LDCT exam also takes pictures of areas of your body next to your lungs. In a very small number of cases, the CT scan will show an abnormal finding in one of these areas, such as your kidneys, adrenal glands, liver or thyroid. This finding may not be serious, but you may need more tests. Your doctor can help you decide what other tests you may need, if any.  What can I expect from the results?  About 1 out of 4 LDCT exams will find something that may need more tests. Most of the time, these findings are lung nodules. Lung nodules are very small collections of tissue in the lung. These nodules are very common, and the vast majority--more than 97 percent--are not cancer (benign). Most are normal lymph nodes or small areas of scarring from past infections.  But, if a small lung nodule is found to be cancer, the cancer can be cured more than 90 percent of the time. To know if the nodule is cancer, we may need to get more images before your next yearly screening exam. If the nodule has suspicious features (for example, it is large, has an odd shape or grows over time), we will refer you to a specialist for further testing.  Will my doctor also get the results?  Yes. Your doctor will get a copy of your results.  Is it okay to keep smoking now that there s a cancer screening exam?  No. Tobacco is one of the  strongest cancer-causing agents. It causes not only lung cancer, but other cancers and cardiovascular (heart) diseases as well. The damage caused by smoking builds over time. This means that the longer you smoke, the higher your risk of disease. While it is never too late to quit, the sooner you quit, the better.  Where can I find help to quit smoking?  The best way to prevent lung cancer is to stop smoking. If you have already quit smoking, congratulations and keep it up! For help on quitting smoking, please call [employer] at 3-154-051-WXQV ([phone]) or the American [employer] Society at 1-115.865.6963 to find local resources near you.  One-on-one health coaching:  If you d prefer to work individually with a health care provider on tobacco cessation, we offer:      Medication Therapy Management:  Our specially trained pharmacists work closely with you and your doctor to help you quit smoking.  Call [phone] or [phone] (toll free).     Can Do: Health coaching offered by [facility].  [email or web address]

## 2021-11-24 LAB
LAMOTRIGINE SERPL-MCNC: 7.4 UG/ML
LEVETIRACETAM SERPL-MCNC: 17 UG/ML

## 2021-12-06 DIAGNOSIS — E55.9 VITAMIN D DEFICIENCY: Primary | ICD-10-CM

## 2021-12-09 RX ORDER — CHOLECALCIFEROL (VITAMIN D3) 50 MCG
TABLET ORAL
Qty: 30 TABLET | Refills: 10 | Status: SHIPPED | OUTPATIENT
Start: 2021-12-09 | End: 2022-03-23

## 2021-12-09 NOTE — TELEPHONE ENCOUNTER
Routing refill request to provider for review/approval because:  --Vit D has not been ordered by a provider in this chart.  --Looks like an MA entered it historically in a Hpart visit 8/27/2020.            --Last visit:  11/22/2021 Wendy BA.    --Future Visit: 5/25/22 Gerry

## 2022-02-08 ENCOUNTER — ANCILLARY PROCEDURE (OUTPATIENT)
Dept: CT IMAGING | Facility: CLINIC | Age: 67
End: 2022-02-08
Attending: FAMILY MEDICINE
Payer: COMMERCIAL

## 2022-02-08 DIAGNOSIS — Z87.891 PERSONAL HISTORY OF TOBACCO USE: ICD-10-CM

## 2022-02-08 PROCEDURE — 71271 CT THORAX LUNG CANCER SCR C-: CPT | Mod: GC | Performed by: RADIOLOGY

## 2022-02-09 ENCOUNTER — TELEPHONE (OUTPATIENT)
Dept: FAMILY MEDICINE | Facility: CLINIC | Age: 67
End: 2022-02-09
Payer: COMMERCIAL

## 2022-02-09 DIAGNOSIS — I25.10 CORONARY ARTERY CALCIFICATION: Primary | ICD-10-CM

## 2022-02-09 DIAGNOSIS — R91.8 PULMONARY NODULES: ICD-10-CM

## 2022-02-09 DIAGNOSIS — R91.1 LUNG NODULE: ICD-10-CM

## 2022-02-09 NOTE — TELEPHONE ENCOUNTER
Abnormal ct chest.   As per my understanding patient/group home has been notified about possible indeterminant lesion of his lung and he has been given referral.  He will be seeing a lung specialist for further evaluation as per Epic documentation.     -He also has significant coronary artery disease as per scan.  He is already on statins.  I recommend he should also see cardiologist for further evaluation.  Quitting smoking completely is very very important. Cardiology referral signed.

## 2022-02-09 NOTE — TELEPHONE ENCOUNTER
Called [facility]. This is listed as pt's phone #.  LM for staff to clinic triage to review this result message.  JOSY Alexander

## 2022-02-09 NOTE — TELEPHONE ENCOUNTER
[facility] Radiology Lung Cancer Screening CT result notification:     LDCT/Lung Cancer Screening CT Exam date: 2/8/22  Radiologist Impression AND Recommendations:   ACR Assessment Category (v1.1):  Lung-RADS Category 4A. Suspicious.   Indeterminate focus of attenuation within the proximal lingular  bronchus may represent mucous/secretions but this lesion appears to  have increased in size from the prior study and may represent an  indolent neoplasm such as adenocarcinoma.    Pertinent Findings:  Findings: [All follow up of nodules are based on ACR guidelines for  lung cancer screening and measurements of each nodule size must be the  mean of the longest axial plane measurement by its perpendicular  measured to the nearest decimal and rounded up to the nearest whole  number. ]     Nodules:      - 5  mm solid nodule in the right middle lobe on series:  3 image:   161.     Emphysema: Trace centrilobular emphysema     Coronary artery calcium: Mild     Additional findings: The central trachea is patent. There is an  indeterminate focus of attenuation within the proximal left upper lobe  bronchus (series 3, image 162). No airspace consolidation, pleural  effusion or pneumothorax. No axillary or mediastinal adenopathy. Heart  size is normal. Descending aorta and proximal pulmonary artery normal  diameter. Normal great vessel anatomy.     Ordering Provider: Reji Nguyen MD Richard D Cary did not receive the remaining radiology results from her provider.     Lung Nodule Program Protocol recommendation [Pertaining to lung nodules]:      Lung RADS 4A Protocol:  Results RN to notify Patient of results/recommendations and offer a referral to [facility] Lung Nodule Clinic within 4 to 6 weeks  o Offer referral to [facility] Lung Nodule clinic instead of the 3 month CT. (Yes/No/NA):  Yes  o If Patient agrees to a referral to [facility] Lung Nodule Clinic, place referral to [facility] Lung Nodule Clinic to be  seen within 4 to 6 weeks.  (Yes/No/NA):  Yes  o If Patient refuses referral, place order for 3 month CT ([id number]).   (Yes/No/NA):  NA  o Relay information to Patient:  [facility] Scheduling team, [phone], will be calling Patient within 1 week to schedule appt - appt only M-F. (Yes/No/NA): Yes  o Results RN routed telephone encounter as FYI to CNS team (Yes/No): Yes    RN communicated the lung nodule finding to the patient (Yes/No):  Yes, to the patient's group home staff. Advised to contact the PCP regarding the additional findings and that the lung nodule specialist would also be reviewing this.   The patient had the following questions: none  Correct letter sent as per [facility] Lung nodule protocol (Yes/No):  Yes  Did Patient have any CT's of chest previous? (Yes/No/NA) Comparison used  If no comparisons used, inquire if patient has had any chest CT's in the past and if so, request priors.    If scheduling LDCT only, RN will contact Image Scheduling Team  Hours available (all sites below):  Mon-Fri 7A to 7P; Sat 7A to 3:30P.  No schedulers available on Sunday.    [facility]): [phone]    North region ([facility], [place]): [phone]    [place] region ([place]): [phone]    East region ([facility]): [phone]    Lung Nodule Clinics    Pulomonary (Lung Care) Clinic at the [facility]  Floor 2, Check-In D, [address], [place], [place]    Hours: M-F 7AM to 5PM    [facility]     Floor 2, [address], [place]  [phone]    Hours: M - F 7AM - 7PM;  Sat 8A to noon     [facility] at [facility]    [address] [place], [place], [phone]    Hours: M - F 7AM - 7PM;  Sat 8A to noon    April Jay RN  [facility]  Lung Nodule and Emergency Dept Lab Result RN  Ph#  [phone]

## 2022-02-17 NOTE — TELEPHONE ENCOUNTER
RECORDS STATUS - ALL OTHER DIAGNOSIS      RECORDS RECEIVED FROM: [facility]    DATE RECEIVED:    NOTES STATUS DETAILS   OFFICE NOTE from referring provider Reji Rosales MD in  FAMILY PRAC/IMPEDS: 11/22/21   MEDICATION LIST [facility] 2/10/22   LABS     ANYTHING RELATED TO DIAGNOSIS Epic 11/22/21   IMAGING (NEED IMAGES & REPORT)     CT SCANS PACS 8/14/20, 8/12/19, 8/6/18, 8/2/17:     2/8/22, 2/5/21: Epic/PACS

## 2022-02-22 NOTE — TELEPHONE ENCOUNTER
Spoke with Milton, staff at [facility].  He states that his manager would be the touchpoint person for this referral information but he is not in office currently.  Left message for manager to please call back and ask to speak with a triage nurse regarding the two referrals below:    Lung nodule: If you have not heard from the scheduling office within 2 business days, please call [phone]    Cardiology: If you don't hear from a representative within 2 business days, please call [phone].    Gail Rene RN  [facility]

## 2022-02-22 NOTE — TELEPHONE ENCOUNTER
Arnel, manager at [facility] returning our call.  Referral numbers given.    Gail Rene RN  [facility]

## 2022-03-09 ENCOUNTER — PRE VISIT (OUTPATIENT)
Dept: PULMONOLOGY | Facility: CLINIC | Age: 67
End: 2022-03-09
Payer: COMMERCIAL

## 2022-03-09 ENCOUNTER — VIRTUAL VISIT (OUTPATIENT)
Dept: PULMONOLOGY | Facility: CLINIC | Age: 67
End: 2022-03-09
Attending: FAMILY MEDICINE
Payer: COMMERCIAL

## 2022-03-09 DIAGNOSIS — R91.1 LUNG NODULE: ICD-10-CM

## 2022-03-09 PROCEDURE — 99204 OFFICE O/P NEW MOD 45 MIN: CPT | Mod: 95 | Performed by: INTERNAL MEDICINE

## 2022-03-09 NOTE — LETTER
3/9/2022       RE: Karlo Townsend  [address]  [facility] [address]     Dear Colleague,    Thank you for referring your patient, Karlo Townsend, to the [facility] at [facility]. Please see a copy of my visit note below.        [facility]  Interventional Pulmonary Clinic Virtual Visit Note    March 9, 2022    Chief complaint:  Karlo Townsend is a 66 year old male seen for   Chief Complaint   Patient presents with     Video Visit       Reason for clinic visit / Chief complaint:   Endobronchial lesion    Assessment and Plan:  Endobronchial lesion in the left upper lobe bronchus.  I have personally reviewed CT scans from 2017, 2018, 2019, 2020, 2021 and 2022.  Last CT scan from 2022 reveals more discrete endobronchial lesion than previous CT scans.  There is irregularity in the same area on CT scan from 2017.  He has no history of hemoptysis or other respiratory symptoms.  We discussed possible etiologies and my recommendation was to inject bronchoscopic examination involving biopsy if there is any abnormal area which is in agreement.  I discussed possible complications of the procedure.  In case, the lesion requires removal bronchoscopically we will plan to do the procedure in the OR under general anesthesia which he is in agreement.  This lesion is high risk for malignancy and unlikely to be secretion.    History of seizure disorder, last seizure 5 years ago, followed by neurology, on Keppra.    Current smoker, smoked 40 years anywhere between half a pack to a pack a day.    History of Present Illness:  66 years old gentleman with smoking history therefore high risk for lung cancer based on years and amount smoked referred to our clinic for further evaluation of abnormal CT revealing endobronchial lesion in the left upper lobe.  He denied having hemoptysis, fever chills or chest pains.  No history of phlegm production or  dyspnea on exertion.  Has history of seizure disorder which has been treated in he is seizure-free for the last 5 years.  No self or family history of cancer.    Allergies   Allergen Reactions     Valproic Acid Other (See Comments)     Confusion/Sedation        No past medical history on file.     No past surgical history on file.     Social History     Socioeconomic History     Marital status: Single     Spouse name: Not on file     Number of children: Not on file     Years of education: Not on file     Highest education level: Not on file   Occupational History     Not on file   Tobacco Use     Smoking status: Current Every Day Smoker     Packs/day: 0.50     Years: 40.00     Pack years: 20.00     Types: Cigarettes     Smokeless tobacco: Never Used   Vaping Use     Vaping Use: Never used   Substance and Sexual Activity     Alcohol use: Not Currently     Drug use: Never     Sexual activity: Yes     Partners: Female   Other Topics Concern     Not on file   Social History Narrative     Not on file     Social Determinants of Health     Financial Resource Strain: Not on file   Food Insecurity: Not on file   Transportation Needs: Not on file   Physical Activity: Not on file   Stress: Not on file   Social Connections: Not on file   Intimate Partner Violence: Not on file   Housing Stability: Not on file        Family History   Problem Relation Age of Onset     Diabetes No family hx of      Glaucoma No family hx of      Macular Degeneration No family hx of         Immunization History   Administered Date(s) Administered     COVID-19,PF,Moderna 01/29/2021, 02/26/2021     COVID-19,PF,Moderna Booster 11/22/2021     Flu, Unspecified 10/17/2013     HepA-Adult 08/27/2020     HepB-Adult 06/24/2016, 07/22/2016, 10/24/2016     Influenza (IIV3) PF 11/18/2005, 10/17/2013, 11/07/2017     Influenza Vaccine IM > 6 months Valent IIV4 (Alfuria,Fluzone) 11/09/2015, 10/24/2016, 11/15/2018, 10/16/2019, 09/22/2020     Pneumococcal 23 valent  10/15/2014, 07/22/2016     TDAP Vaccine (Adacel) 07/12/2008, 08/27/2013     Tdap (Adacel,Boostrix) 11/20/2020     Zoster vaccine recombinant adjuvanted (SHINGRIX) 11/18/2019, 08/27/2020       Current Outpatient Medications   Medication Sig     alcohol swab prep pads Use to swab area of injection/gatito as directed     amLODIPine (NORVASC) 10 MG tablet Take 1 tablet (10 mg) by mouth daily     atorvastatin (LIPITOR) 40 MG tablet Take 1 tablet (40 mg) by mouth daily     B-D U/F insulin pen needle USE DAILY.     blood glucose (ACCU-CHEK VANIA PLUS) test strip TEST TWICE DAILY     blood glucose calibration (NO BRAND SPECIFIED) solution Use to calibrate blood glucose monitor as needed as directed. To accompany: Blood Glucose Monitor Brands: per insurance.     LamoTRIgine (LAMICTAL XR) 300 MG 24 hr tablet Take 300 mg by mouth every morning     LANTUS SOLOSTAR 100 UNIT/ML soln INJECT 22 UNITS SUBCUTANEOUSLY ONCE DAILY *DISCARD PEN 28 DAYS AFTER FIRST USE, DO NOT REFRIGERATE AFTER FIRST USE*     levETIRAcetam (KEPPRA) 1000 MG tablet Take 1,000 mg by mouth     omeprazole (PRILOSEC) 20 MG DR capsule Take 1 capsule (20 mg) by mouth daily     sodium chloride (OCEAN) 0.65 % nasal spray Spray 1 spray into both nostrils daily as needed for congestion     thin (NO BRAND SPECIFIED) lancets Use to test blood sugar 2 times daily or as directed. To accompany: Blood Glucose Monitor Brands: per insurance.     VITAMIN D3 50 MCG (2000 UT) tablet TAKE 1 TABLET BY MOUTH ONCE DAILY     No current facility-administered medications for this visit.        Review of Systems:  I have done 10 points of review systems and all negative except for those mentioned in HPI    Physical examination  Constitutional: Oriented, not in distress  Vitals: unavailable  Eyes: No icterus, nystagmus, pupils isocoric  Head and neck: normal posture and movements  Respiratory: Normal tidal breathing, no shortness of breath, no audible wheezing or stridor over the phone  or video visit  Musculoskeletal: Normal muscle mass, no deformity on hands/fingers  Integumentary:  No rash on visible skin areas on video visit  Neurological: Alert, orientedx3, no motor deficits  Psychiatric:  Mood and affect are appropriate with insight into his/her medical condition    Data:    Lab Results   Component Value Date     11/22/2021     11/20/2020      Lab Results   Component Value Date    POTASSIUM 4.0 11/22/2021    POTASSIUM 4.0 11/20/2020     Lab Results   Component Value Date    CHLORIDE 104 11/22/2021    CHLORIDE 106 11/20/2020     Lab Results   Component Value Date    MELISSA 9.3 11/22/2021    MELISSA 9.4 11/20/2020     Lab Results   Component Value Date    CO2 28 11/22/2021    CO2 32 11/20/2020     Lab Results   Component Value Date    BUN 13 11/22/2021    BUN 13 11/20/2020     Lab Results   Component Value Date    CR 1.09 11/22/2021    CR 1.06 11/20/2020     Lab Results   Component Value Date    GLC 93 11/22/2021    GLC 88 11/20/2020         H. Manuel Nice MD        AVS faxed to patient per patient request.      Again, thank you for allowing me to participate in the care of your patient.      Sincerely,    Karlo Nice MD

## 2022-03-09 NOTE — PROGRESS NOTES
Karlo is a 66 year old who is being evaluated via a billable video visit.      How would you like to obtain your AVS? Mail a copy  If the video visit is dropped, the invitation should be resent by: Send to e-mail at: [email or web address]  Will anyone else be joining your video visit? No      Video Start Time: 13:01  Video-Visit Details    Type of service:  Video Visit    Video End Time: 13:12    Originating Location (pt. Location): Home    Distant Location (provider location):  [facility]     Platform used for Video Visit: [employer] network disks        [facility]  Interventional Pulmonary Clinic Virtual Visit Note    March 9, 2022    Chief complaint:  Karlo Townsend is a 66 year old male seen for   Chief Complaint   Patient presents with     Video Visit       Reason for clinic visit / Chief complaint:   Endobronchial lesion    Assessment and Plan:  Endobronchial lesion in the left upper lobe bronchus.  I have personally reviewed CT scans from 2017, 2018, 2019, 2020, 2021 and 2022.  Last CT scan from 2022 reveals more discrete endobronchial lesion than previous CT scans.  There is irregularity in the same area on CT scan from 2017.  He has no history of hemoptysis or other respiratory symptoms.  We discussed possible etiologies and my recommendation was to inject bronchoscopic examination involving biopsy if there is any abnormal area which is in agreement.  I discussed possible complications of the procedure.  In case, the lesion requires removal bronchoscopically we will plan to do the procedure in the OR under general anesthesia which he is in agreement.  This lesion is high risk for malignancy and unlikely to be secretion.    History of seizure disorder, last seizure 5 years ago, followed by neurology, on Keppra.    Current smoker, smoked 40 years anywhere between half a pack to a pack a day.    History of Present Illness:  66 years old gentleman with smoking history  therefore high risk for lung cancer based on years and amount smoked referred to our clinic for further evaluation of abnormal CT revealing endobronchial lesion in the left upper lobe.  He denied having hemoptysis, fever chills or chest pains.  No history of phlegm production or dyspnea on exertion.  Has history of seizure disorder which has been treated in he is seizure-free for the last 5 years.  No self or family history of cancer.    Allergies   Allergen Reactions     Valproic Acid Other (See Comments)     Confusion/Sedation        No past medical history on file.     No past surgical history on file.     Social History     Socioeconomic History     Marital status: Single     Spouse name: Not on file     Number of children: Not on file     Years of education: Not on file     Highest education level: Not on file   Occupational History     Not on file   Tobacco Use     Smoking status: Current Every Day Smoker     Packs/day: 0.50     Years: 40.00     Pack years: 20.00     Types: Cigarettes     Smokeless tobacco: Never Used   Vaping Use     Vaping Use: Never used   Substance and Sexual Activity     Alcohol use: Not Currently     Drug use: Never     Sexual activity: Yes     Partners: Female   Other Topics Concern     Not on file   Social History Narrative     Not on file     Social Determinants of Health     Financial Resource Strain: Not on file   Food Insecurity: Not on file   Transportation Needs: Not on file   Physical Activity: Not on file   Stress: Not on file   Social Connections: Not on file   Intimate Partner Violence: Not on file   Housing Stability: Not on file        Family History   Problem Relation Age of Onset     Diabetes No family hx of      Glaucoma No family hx of      Macular Degeneration No family hx of         Immunization History   Administered Date(s) Administered     COVID-19,PF,Moderna 01/29/2021, 02/26/2021     COVID-19,PF,Moderna Booster 11/22/2021     Flu, Unspecified 10/17/2013      HepA-Adult 08/27/2020     HepB-Adult 06/24/2016, 07/22/2016, 10/24/2016     Influenza (IIV3) PF 11/18/2005, 10/17/2013, 11/07/2017     Influenza Vaccine IM > 6 months Valent IIV4 (Alfuria,Fluzone) 11/09/2015, 10/24/2016, 11/15/2018, 10/16/2019, 09/22/2020     Pneumococcal 23 valent 10/15/2014, 07/22/2016     TDAP Vaccine (Adacel) 07/12/2008, 08/27/2013     Tdap (Adacel,Boostrix) 11/20/2020     Zoster vaccine recombinant adjuvanted (SHINGRIX) 11/18/2019, 08/27/2020       Current Outpatient Medications   Medication Sig     alcohol swab prep pads Use to swab area of injection/gatito as directed     amLODIPine (NORVASC) 10 MG tablet Take 1 tablet (10 mg) by mouth daily     atorvastatin (LIPITOR) 40 MG tablet Take 1 tablet (40 mg) by mouth daily     B-D U/F insulin pen needle USE DAILY.     blood glucose (ACCU-CHEK VANIA PLUS) test strip TEST TWICE DAILY     blood glucose calibration (NO BRAND SPECIFIED) solution Use to calibrate blood glucose monitor as needed as directed. To accompany: Blood Glucose Monitor Brands: per insurance.     LamoTRIgine (LAMICTAL XR) 300 MG 24 hr tablet Take 300 mg by mouth every morning     LANTUS SOLOSTAR 100 UNIT/ML soln INJECT 22 UNITS SUBCUTANEOUSLY ONCE DAILY *DISCARD PEN 28 DAYS AFTER FIRST USE, DO NOT REFRIGERATE AFTER FIRST USE*     levETIRAcetam (KEPPRA) 1000 MG tablet Take 1,000 mg by mouth     omeprazole (PRILOSEC) 20 MG DR capsule Take 1 capsule (20 mg) by mouth daily     sodium chloride (OCEAN) 0.65 % nasal spray Spray 1 spray into both nostrils daily as needed for congestion     thin (NO BRAND SPECIFIED) lancets Use to test blood sugar 2 times daily or as directed. To accompany: Blood Glucose Monitor Brands: per insurance.     VITAMIN D3 50 MCG (2000 UT) tablet TAKE 1 TABLET BY MOUTH ONCE DAILY     No current facility-administered medications for this visit.        Review of Systems:  I have done 10 points of review systems and all negative except for those mentioned in  HPI    Physical examination  Constitutional: Oriented, not in distress  Vitals: unavailable  Eyes: No icterus, nystagmus, pupils isocoric  Head and neck: normal posture and movements  Respiratory: Normal tidal breathing, no shortness of breath, no audible wheezing or stridor over the phone or video visit  Musculoskeletal: Normal muscle mass, no deformity on hands/fingers  Integumentary:  No rash on visible skin areas on video visit  Neurological: Alert, orientedx3, no motor deficits  Psychiatric:  Mood and affect are appropriate with insight into his/her medical condition    Data:    Lab Results   Component Value Date     11/22/2021     11/20/2020      Lab Results   Component Value Date    POTASSIUM 4.0 11/22/2021    POTASSIUM 4.0 11/20/2020     Lab Results   Component Value Date    CHLORIDE 104 11/22/2021    CHLORIDE 106 11/20/2020     Lab Results   Component Value Date    MELISSA 9.3 11/22/2021    MELISSA 9.4 11/20/2020     Lab Results   Component Value Date    CO2 28 11/22/2021    CO2 32 11/20/2020     Lab Results   Component Value Date    BUN 13 11/22/2021    BUN 13 11/20/2020     Lab Results   Component Value Date    CR 1.09 11/22/2021    CR 1.06 11/20/2020     Lab Results   Component Value Date    GLC 93 11/22/2021    GLC 88 11/20/2020         EDGAR Nice MD

## 2022-03-09 NOTE — PATIENT INSTRUCTIONS
Your CT scan of the chest revealed a small irregular area in one of the ear tubes in your left lung.  It is hard to say exactly what this is but we will schedule you a bronchoscopy procedure that will allow us to directly see the lesion in the air tube.  If it looks abnormal we will take small biopsies during the procedure.  Typically, biopsy results comes back in 3-5 business days.  According to biopsy results we will discuss further steps.  Of course, this is assuming that we will take a biopsy.  Our  will call you within 1 to 2 days to schedule the bronchoscopy and give you instructions for the procedure.    Manuel Nice MD

## 2022-03-10 NOTE — TELEPHONE ENCOUNTER
RECORDS RECEIVED FROM: Internal/ external   DATE RECEIVED:3.22.22   NOTES STATUS DETAILS   OFFICE NOTE from referring provider    Internal 2.9.22 RACHEL Nguyen [place]    OFFICE NOTE from other cardiologist        DISCHARGE SUMMARY from hospital        DISCHARGE REPORT from the ER       OPERATIVE REPORT        MEDICATION LIST   Internal    LABS     BMP   Internal 11.22.21   CBC       CMP   Internal 11.20.20   Lipids   Internal 11.22.21   TSH       DIAGNOSTIC PROCEDURES     EKG   Received 2.3.14 [facility]   Monitor Reports       IMAGING (DISC & REPORT)      Echo        Stress Tests       Cath       MRI/MRA       CT/CTA         Action 3.10.22   Action Taken Requested EKG strips from [facility].     Action 3.17.22 [facility]   Action Taken 2nd request     Action 3.18.22 [facility]   Action Taken Received EKG strips sent to urgent scanning.

## 2022-03-14 ENCOUNTER — TELEPHONE (OUTPATIENT)
Dept: PULMONOLOGY | Facility: CLINIC | Age: 67
End: 2022-03-14
Payer: COMMERCIAL

## 2022-03-14 DIAGNOSIS — Z11.59 ENCOUNTER FOR SCREENING FOR OTHER VIRAL DISEASES: Primary | ICD-10-CM

## 2022-03-14 NOTE — TELEPHONE ENCOUNTER
Called patient to schedule procedure,   Patient stated I needed to call back on Wednesday since the person that takes care of his schedule is out today.     Sx spot holding on 03/25 with Dr. Magana,     Writer will call back.     Lala Brady  [facility]-Op Coordinator  [phone]

## 2022-03-17 NOTE — TELEPHONE ENCOUNTER
FUTURE VISIT INFORMATION      SURGERY INFORMATION:    Date: 3/25/2022    Location: [facility] OR    Surgeon:  Chela Magana MD    Anesthesia Type:  General    Procedure: BRONCHOSCOPY, FIBEROPTIC, endobronchial biopsy, endobronchial ultrasound guided transbronchial biopsy    Consult:     RECORDS REQUESTED FROM:       Primary Care Provider: Sagar Paris PA-C ([facility])     Pertinent Medical History: Essential hypertension    Most recent EKG+ Tracin/3/14 ([facility])

## 2022-03-17 NOTE — TELEPHONE ENCOUNTER
Spoke with patient to schedule procedure with Dr. Chela Magana   Procedure was scheduled on 03/25 at [facility] [place]  Patient will have H&P with PAC    Patient is aware a COVID-19 test is needed before their procedure. The test should be with-in 4 days of their procedure.   Test Details: Date 03/22 Location [facility]    Patient is aware a / is needed day of surgery.   Surgery letter was sent via e-mail, patient has my direct contact information for any further questions.

## 2022-03-22 ENCOUNTER — PRE VISIT (OUTPATIENT)
Dept: CARDIOLOGY | Facility: CLINIC | Age: 67
End: 2022-03-22
Payer: COMMERCIAL

## 2022-03-22 ENCOUNTER — OFFICE VISIT (OUTPATIENT)
Dept: CARDIOLOGY | Facility: CLINIC | Age: 67
End: 2022-03-22
Attending: FAMILY MEDICINE
Payer: COMMERCIAL

## 2022-03-22 ENCOUNTER — LAB (OUTPATIENT)
Dept: LAB | Facility: CLINIC | Age: 67
End: 2022-03-22
Attending: INTERNAL MEDICINE

## 2022-03-22 VITALS
BODY MASS INDEX: 26.83 KG/M2 | SYSTOLIC BLOOD PRESSURE: 146 MMHG | HEIGHT: 73 IN | OXYGEN SATURATION: 97 % | DIASTOLIC BLOOD PRESSURE: 73 MMHG | HEART RATE: 76 BPM | WEIGHT: 202.4 LBS

## 2022-03-22 DIAGNOSIS — Z11.59 ENCOUNTER FOR SCREENING FOR OTHER VIRAL DISEASES: ICD-10-CM

## 2022-03-22 DIAGNOSIS — I25.10 CORONARY ARTERY CALCIFICATION: ICD-10-CM

## 2022-03-22 PROCEDURE — U0003 INFECTIOUS AGENT DETECTION BY NUCLEIC ACID (DNA OR RNA); SEVERE ACUTE RESPIRATORY SYNDROME CORONAVIRUS 2 (SARS-COV-2) (CORONAVIRUS DISEASE [COVID-19]), AMPLIFIED PROBE TECHNIQUE, MAKING USE OF HIGH THROUGHPUT TECHNOLOGIES AS DESCRIBED BY CMS-2020-01-R: HCPCS

## 2022-03-22 PROCEDURE — G0463 HOSPITAL OUTPT CLINIC VISIT: HCPCS

## 2022-03-22 PROCEDURE — 99204 OFFICE O/P NEW MOD 45 MIN: CPT | Performed by: INTERNAL MEDICINE

## 2022-03-22 PROCEDURE — U0005 INFEC AGEN DETEC AMPLI PROBE: HCPCS

## 2022-03-22 ASSESSMENT — PAIN SCALES - GENERAL: PAINLEVEL: NO PAIN (0)

## 2022-03-22 NOTE — NURSING NOTE
Chief Complaint   Patient presents with     New Patient      new - referral from PCP 2-9-22 visit Wendy     Vitals were taken and medications reconciled.    Miles Rosales, EMT  8:55 AM

## 2022-03-22 NOTE — PATIENT INSTRUCTIONS
Complete an exercise stress echocardiogram.    As soon as results are compiled and reviewed, you will be notified.

## 2022-03-22 NOTE — LETTER
3/22/2022      RE: Karlo Townsend  [address]  [facility] [address]       Dear Colleague,    Thank you for the opportunity to participate in the care of your patient, Karlo Townsend, at the [facility] [place] at [facility]. Please see a copy of my visit note below.       SUBJECTIVE:  Karlo Townsend is a 66 year old male who presents for evaluation of incidentally detected coronary artery calcification.  Patient with traumatic brain injury.  Lives in a group home.  For cancer screening patient had a low-dose CT scan of the chest.  This showed coronary artery calcification.  Patient is not a very active person.  Denied chest pain or shortness of breath on exertion though his functional capacity is limited.  He is a current smoker.  Other risk factors are hypertension and hyperlipidemia.  He is a type II diabetic on insulin.    Patient Active Problem List    Diagnosis Date Noted     Lung nodule 02/09/2022     Priority: Medium     [facility] [employer] [place] Results [facility].         History of traumatic brain injury 11/22/2021     Priority: Medium     Essential hypertension 11/22/2021     Priority: Medium     Gastroesophageal reflux disease without esophagitis 11/22/2021     Priority: Medium     Type 2 diabetes mellitus without complication, with long-term current use of insulin (H) 11/22/2021     Priority: Medium     Personal history of tobacco use 11/22/2021     Priority: Medium     Seizure disorder (H) 11/22/2021     Priority: Medium    .  Current Outpatient Medications   Medication Sig     alcohol swab prep pads Use to swab area of injection/gatito as directed     amLODIPine (NORVASC) 10 MG tablet Take 1 tablet (10 mg) by mouth daily     atorvastatin (LIPITOR) 40 MG tablet Take 1 tablet (40 mg) by mouth daily     B-D U/F insulin pen needle USE DAILY.     blood glucose (ACCU-CHEK VANIA PLUS) test strip  TEST TWICE DAILY     blood glucose calibration (NO BRAND SPECIFIED) solution Use to calibrate blood glucose monitor as needed as directed. To accompany: Blood Glucose Monitor Brands: per insurance.     LamoTRIgine (LAMICTAL XR) 300 MG 24 hr tablet Take 300 mg by mouth every morning     LANTUS SOLOSTAR 100 UNIT/ML soln INJECT 22 UNITS SUBCUTANEOUSLY ONCE DAILY *DISCARD PEN 28 DAYS AFTER FIRST USE, DO NOT REFRIGERATE AFTER FIRST USE*     levETIRAcetam (KEPPRA) 1000 MG tablet Take 1,000 mg by mouth     omeprazole (PRILOSEC) 20 MG DR capsule Take 1 capsule (20 mg) by mouth daily     sodium chloride (OCEAN) 0.65 % nasal spray Spray 1 spray into both nostrils daily as needed for congestion     thin (NO BRAND SPECIFIED) lancets Use to test blood sugar 2 times daily or as directed. To accompany: Blood Glucose Monitor Brands: per insurance.     VITAMIN D3 50 MCG (2000 UT) tablet TAKE 1 TABLET BY MOUTH ONCE DAILY     No current facility-administered medications for this visit.     No past medical history on file.  No past surgical history on file.  Allergies   Allergen Reactions     Valproic Acid Other (See Comments)     Confusion/Sedation     Social History     Socioeconomic History     Marital status: Single     Spouse name: Not on file     Number of children: Not on file     Years of education: Not on file     Highest education level: Not on file   Occupational History     Not on file   Tobacco Use     Smoking status: Current Every Day Smoker     Packs/day: 0.50     Years: 40.00     Pack years: 20.00     Types: Cigarettes     Smokeless tobacco: Never Used   Vaping Use     Vaping Use: Never used   Substance and Sexual Activity     Alcohol use: Not Currently     Drug use: Never     Sexual activity: Yes     Partners: Female   Other Topics Concern     Not on file   Social History Narrative     Not on file     Social Determinants of Health     Financial Resource Strain: Not on file   Food Insecurity: Not on file  "  Transportation Needs: Not on file   Physical Activity: Not on file   Stress: Not on file   Social Connections: Not on file   Intimate Partner Violence: Not on file   Housing Stability: Not on file     Family History   Problem Relation Age of Onset     Diabetes No family hx of      Glaucoma No family hx of      Macular Degeneration No family hx of           REVIEW OF SYSTEMS:  General: negative, fever, chills, night sweats  Skin: negative, acne, rash and scaling  Eyes: negative, double vision, eye pain and photophobia  Ears/Nose/Throat: negative, nasal congestion and purulent rhinorrhea  Respiratory: No dyspnea on exertion, No cough, No hemoptysis and negative  Cardiovascular: negative, palpitations, tachycardia, irregular heart beat, chest pain, exertional chest pain or pressure, paroxysmal nocturnal dyspnea, dyspnea on exertion and orthopnea       OBJECTIVE:  Blood pressure (!) 146/73, pulse 76, height 1.845 m (6' 0.64\"), weight 91.8 kg (202 lb 6.4 oz), SpO2 97 %.  General Appearance: healthy, alert and no distress  Head: Normocephalic. No masses, lesions, tenderness or abnormalities  Eyes: conjuctiva clear, PERRL, EOM intact  Ears: External ears normal. Canals clear. TM's normal.  Nose: Nares normal  Mouth: normal  Neck: Supple, no cervical adenopathy, no thyromegaly  Lungs: clear to auscultation  Cardiac: regular rate and rhythm, normal S1 and S2, no murmur       ASSESSMENT/PLAN:  Patient here for evaluation of incidentally detected coronary artery calcification.  Patient is a current smoker.  As part of cancer screening patient had a low-dose CT scan of chest.  This showed coronary artery calcification.  Patient is not very active.  Denied cardiac symptoms.  History may be unreliable due to traumatic brain injury.  Patient lives in a group home.  Cardiac risk factors are current smoking, hypertension, hyperlipidemia and type 2 diabetes.  Care everywhere reviewed.  Patient had multiple CT scans in the past 5 to " 6 years.  Recent ones showing mild coronary artery calcification.  EKG shows normal sinus rhythm normal EKG.  Discussed significance of coronary artery calcification and management.  As it is difficult to assess his functional capacity and symptoms we will plan for an exercise stress echo.  Discussed quitting smoking.  His diabetes is well controlled.  Discussed risk factor modification in detail.  Patient will be contacted with the test results.  His current cardiac medications are amlodipine 10 mg daily and atorvastatin 40 mg daily.  Patient is on insulin for diabetes.  Latest hemoglobin A1c was 6.3.  Patient will be contacted with the test results.  She is advised to continue his current medications.  Return to Clinic as needed.  Total visit duration 45 minutes.  This included face-to-face interview, physical exam, chart review, review of Care Everywhere, EKG, CT scans and documentation.      Please do not hesitate to contact me if you have any questions/concerns.     Sincerely,     GUS Vargas MD

## 2022-03-22 NOTE — PROGRESS NOTES
SUBJECTIVE:  Karlo Townsend is a 66 year old male who presents for evaluation of incidentally detected coronary artery calcification.  Patient with traumatic brain injury.  Lives in a group home.  For cancer screening patient had a low-dose CT scan of the chest.  This showed coronary artery calcification.  Patient is not a very active person.  Denied chest pain or shortness of breath on exertion though his functional capacity is limited.  He is a current smoker.  Other risk factors are hypertension and hyperlipidemia.  He is a type II diabetic on insulin.    Patient Active Problem List    Diagnosis Date Noted     Lung nodule 02/09/2022     Priority: Medium     Referred to [facility] by [facility] Results Team.         History of traumatic brain injury 11/22/2021     Priority: Medium     Essential hypertension 11/22/2021     Priority: Medium     Gastroesophageal reflux disease without esophagitis 11/22/2021     Priority: Medium     Type 2 diabetes mellitus without complication, with long-term current use of insulin (H) 11/22/2021     Priority: Medium     Personal history of tobacco use 11/22/2021     Priority: Medium     Seizure disorder (H) 11/22/2021     Priority: Medium    .  Current Outpatient Medications   Medication Sig     alcohol swab prep pads Use to swab area of injection/gatito as directed     amLODIPine (NORVASC) 10 MG tablet Take 1 tablet (10 mg) by mouth daily     atorvastatin (LIPITOR) 40 MG tablet Take 1 tablet (40 mg) by mouth daily     B-D U/F insulin pen needle USE DAILY.     blood glucose (ACCU-CHEK VANIA PLUS) test strip TEST TWICE DAILY     blood glucose calibration (NO BRAND SPECIFIED) solution Use to calibrate blood glucose monitor as needed as directed. To accompany: Blood Glucose Monitor Brands: per insurance.     LamoTRIgine (LAMICTAL XR) 300 MG 24 hr tablet Take 300 mg by mouth every morning     LANTUS SOLOSTAR 100 UNIT/ML soln INJECT 22 UNITS SUBCUTANEOUSLY  ONCE DAILY *DISCARD PEN 28 DAYS AFTER FIRST USE, DO NOT REFRIGERATE AFTER FIRST USE*     levETIRAcetam (KEPPRA) 1000 MG tablet Take 1,000 mg by mouth     omeprazole (PRILOSEC) 20 MG DR capsule Take 1 capsule (20 mg) by mouth daily     sodium chloride (OCEAN) 0.65 % nasal spray Spray 1 spray into both nostrils daily as needed for congestion     thin (NO BRAND SPECIFIED) lancets Use to test blood sugar 2 times daily or as directed. To accompany: Blood Glucose Monitor Brands: per insurance.     VITAMIN D3 50 MCG (2000 UT) tablet TAKE 1 TABLET BY MOUTH ONCE DAILY     No current facility-administered medications for this visit.     No past medical history on file.  No past surgical history on file.  Allergies   Allergen Reactions     Valproic Acid Other (See Comments)     Confusion/Sedation     Social History     Socioeconomic History     Marital status: Single     Spouse name: Not on file     Number of children: Not on file     Years of education: Not on file     Highest education level: Not on file   Occupational History     Not on file   Tobacco Use     Smoking status: Current Every Day Smoker     Packs/day: 0.50     Years: 40.00     Pack years: 20.00     Types: Cigarettes     Smokeless tobacco: Never Used   Vaping Use     Vaping Use: Never used   Substance and Sexual Activity     Alcohol use: Not Currently     Drug use: Never     Sexual activity: Yes     Partners: Female   Other Topics Concern     Not on file   Social History Narrative     Not on file     Social Determinants of Health     Financial Resource Strain: Not on file   Food Insecurity: Not on file   Transportation Needs: Not on file   Physical Activity: Not on file   Stress: Not on file   Social Connections: Not on file   Intimate Partner Violence: Not on file   Housing Stability: Not on file     Family History   Problem Relation Age of Onset     Diabetes No family hx of      Glaucoma No family hx of      Macular Degeneration No family hx of            "REVIEW OF SYSTEMS:  General: negative, fever, chills, night sweats  Skin: negative, acne, rash and scaling  Eyes: negative, double vision, eye pain and photophobia  Ears/Nose/Throat: negative, nasal congestion and purulent rhinorrhea  Respiratory: No dyspnea on exertion, No cough, No hemoptysis and negative  Cardiovascular: negative, palpitations, tachycardia, irregular heart beat, chest pain, exertional chest pain or pressure, paroxysmal nocturnal dyspnea, dyspnea on exertion and orthopnea       OBJECTIVE:  Blood pressure (!) 146/73, pulse 76, height 1.845 m (6' 0.64\"), weight 91.8 kg (202 lb 6.4 oz), SpO2 97 %.  General Appearance: healthy, alert and no distress  Head: Normocephalic. No masses, lesions, tenderness or abnormalities  Eyes: conjuctiva clear, PERRL, EOM intact  Ears: External ears normal. Canals clear. TM's normal.  Nose: Nares normal  Mouth: normal  Neck: Supple, no cervical adenopathy, no thyromegaly  Lungs: clear to auscultation  Cardiac: regular rate and rhythm, normal S1 and S2, no murmur       ASSESSMENT/PLAN:  Patient here for evaluation of incidentally detected coronary artery calcification.  Patient is a current smoker.  As part of cancer screening patient had a low-dose CT scan of chest.  This showed coronary artery calcification.  Patient is not very active.  Denied cardiac symptoms.  History may be unreliable due to traumatic brain injury.  Patient lives in a group home.  Cardiac risk factors are current smoking, hypertension, hyperlipidemia and type 2 diabetes.  Care everywhere reviewed.  Patient had multiple CT scans in the past 5 to 6 years.  Recent ones showing mild coronary artery calcification.  EKG shows normal sinus rhythm normal EKG.  Discussed significance of coronary artery calcification and management.  As it is difficult to assess his functional capacity and symptoms we will plan for an exercise stress echo.  Discussed quitting smoking.  His diabetes is well " controlled.  Discussed risk factor modification in detail.  Patient will be contacted with the test results.  His current cardiac medications are amlodipine 10 mg daily and atorvastatin 40 mg daily.  Patient is on insulin for diabetes.  Latest hemoglobin A1c was 6.3.  Patient will be contacted with the test results.  She is advised to continue his current medications.  Return to Clinic as needed.  Total visit duration 45 minutes.  This included face-to-face interview, physical exam, chart review, review of Care Everywhere, EKG, CT scans and documentation.

## 2022-03-23 ENCOUNTER — ANESTHESIA EVENT (OUTPATIENT)
Dept: SURGERY | Facility: CLINIC | Age: 67
End: 2022-03-23
Payer: COMMERCIAL

## 2022-03-23 ENCOUNTER — VIRTUAL VISIT (OUTPATIENT)
Dept: SURGERY | Facility: CLINIC | Age: 67
End: 2022-03-23
Payer: COMMERCIAL

## 2022-03-23 ENCOUNTER — PRE VISIT (OUTPATIENT)
Dept: SURGERY | Facility: CLINIC | Age: 67
End: 2022-03-23

## 2022-03-23 ENCOUNTER — TELEPHONE (OUTPATIENT)
Dept: SURGERY | Facility: CLINIC | Age: 67
End: 2022-03-23

## 2022-03-23 DIAGNOSIS — Z01.818 PRE-OP EVALUATION: Primary | ICD-10-CM

## 2022-03-23 LAB — SARS-COV-2 RNA RESP QL NAA+PROBE: NEGATIVE

## 2022-03-23 PROCEDURE — 99204 OFFICE O/P NEW MOD 45 MIN: CPT | Mod: 95 | Performed by: PHYSICIAN ASSISTANT

## 2022-03-23 RX ORDER — ACETAMINOPHEN 325 MG/1
325-650 TABLET ORAL EVERY 6 HOURS PRN
COMMUNITY
End: 2023-09-27

## 2022-03-23 RX ORDER — IBUPROFEN 200 MG
200 TABLET ORAL EVERY 4 HOURS PRN
COMMUNITY

## 2022-03-23 RX ORDER — LOPERAMIDE HCL 2 MG
2 CAPSULE ORAL 4 TIMES DAILY PRN
COMMUNITY

## 2022-03-23 ASSESSMENT — ENCOUNTER SYMPTOMS: SEIZURES: 1

## 2022-03-23 ASSESSMENT — LIFESTYLE VARIABLES: TOBACCO_USE: 1

## 2022-03-23 NOTE — H&P (VIEW-ONLY)
Pre-Operative H & P     CC:  Preoperative exam to assess for increased cardiopulmonary risk while undergoing surgery and anesthesia.    Date of Encounter: 3/23/2022  Primary Care Physician:  Reji Nguyen     Reason for visit:   Encounter Diagnosis   Name Primary?     Pre-op evaluation Yes       HPI  Karlo Townsend is a 66 year old male who presents for pre-operative H & P in preparation for  Procedure Information     Case: [id number] Date/Time: 03/25/22 1135    Procedure: BRONCHOSCOPY, FIBEROPTIC, endobronchial biopsy, endobronchial ultrasound guided transbronchial biopsy (N/A Bronchus)    Anesthesia type: General    Diagnosis: Lung nodule [R91.1]    Pre-op diagnosis: Lung nodule [R91.1]    [facility]  [facility]    Providers: Chela Magana MD          Patient is being evaluated for comorbid conditions of Hypertension, current tobacco use, seizure disorder, h/o TBI, diabetes, GERD    Mr. Townsend has a history of smoking. He was found to have abnormal CT revealing endobronchial lesion in [place]. He was seen by Dr. Nice for further evaluation and is now scheduled for the above procedure.     History is obtained from the patient and chart review.  Staff member from [facility] was also present, but patient was able to provide medical history independently.     Hx of abnormal bleeding or anti-platelet use: denies      Past Medical History  No past medical history on file.    Past Surgical History  Past Surgical History:   Procedure Laterality Date     APPENDECTOMY       MANDIBLE SURGERY         Prior to Admission Medications  Current Outpatient Medications   Medication Sig Dispense Refill     acetaminophen (TYLENOL) 325 MG tablet Take 325-650 mg by mouth every 6 hours as needed for mild pain       amLODIPine (NORVASC) 10 MG tablet Take 1 tablet (10 mg) by mouth daily (Patient taking differently: Take 10 mg by mouth every morning ) 90 tablet 3     atorvastatin (LIPITOR) 40 MG tablet Take 1 tablet  (40 mg) by mouth daily (Patient taking differently: Take 40 mg by mouth every evening ) 90 tablet 3     diphenhydrAMINE HCl (BENADRYL ALLERGY PO)        ibuprofen (ADVIL/MOTRIN) 200 MG tablet Take 200 mg by mouth every 4 hours as needed for mild pain       LamoTRIgine (LAMICTAL XR) 300 MG 24 hr tablet Take 300 mg by mouth every morning       LANTUS SOLOSTAR 100 UNIT/ML soln INJECT 22 UNITS SUBCUTANEOUSLY ONCE DAILY *DISCARD PEN 28 DAYS AFTER FIRST USE, DO NOT REFRIGERATE AFTER FIRST USE* (Patient taking differently: Inject 22 Units Subcutaneous At Bedtime ) 15 mL 3     levETIRAcetam (KEPPRA) 1000 MG tablet Take 1,000 mg by mouth 2 times daily        loperamide (IMODIUM) 2 MG capsule Take 2 mg by mouth 4 times daily as needed for diarrhea       omeprazole (PRILOSEC) 20 MG DR capsule Take 1 capsule (20 mg) by mouth daily (Patient taking differently: Take 20 mg by mouth every morning ) 90 capsule 3     sodium chloride (OCEAN) 0.65 % nasal spray Spray 1 spray into both nostrils daily as needed for congestion       alcohol swab prep pads Use to swab area of injection/gatito as directed 100 each 3     B-D U/F insulin pen needle USE DAILY. 100 each 1     blood glucose (ACCU-CHEK VANIA PLUS) test strip TEST TWICE DAILY 200 strip 3     blood glucose calibration (NO BRAND SPECIFIED) solution Use to calibrate blood glucose monitor as needed as directed. To accompany: Blood Glucose Monitor Brands: per insurance. 1 Bottle 3     thin (NO BRAND SPECIFIED) lancets Use to test blood sugar 2 times daily or as directed. To accompany: Blood Glucose Monitor Brands: per insurance. 100 each 3       Allergies  Allergies   Allergen Reactions     Valproic Acid Other (See Comments)     Confusion/Sedation     Penicillins        Social History  Social History     Socioeconomic History     Marital status: Single     Spouse name: Not on file     Number of children: Not on file     Years of education: Not on file     Highest education level: Not on  file   Occupational History     Not on file   Tobacco Use     Smoking status: Current Every Day Smoker     Packs/day: 0.50     Years: 40.00     Pack years: 20.00     Types: Cigarettes     Smokeless tobacco: Never Used   Vaping Use     Vaping Use: Never used   Substance and Sexual Activity     Alcohol use: Not Currently     Drug use: Never     Sexual activity: Yes     Partners: Female   Other Topics Concern     Not on file   Social History Narrative     Not on file     Social Determinants of Health     Financial Resource Strain: Not on file   Food Insecurity: Not on file   Transportation Needs: Not on file   Physical Activity: Not on file   Stress: Not on file   Social Connections: Not on file   Intimate Partner Violence: Not on file   Housing Stability: Not on file       Family History  Family History   Problem Relation Age of Onset     Diabetes No family hx of      Glaucoma No family hx of      Macular Degeneration No family hx of      Anesthesia Reaction No family hx of      Deep Vein Thrombosis (DVT) No family hx of        Review of Systems  The complete review of systems is negative other than noted in the HPI or here.   Anesthesia Evaluation   Pt has had prior anesthetic.     No history of anesthetic complications       ROS/MED HX  ENT/Pulmonary:     (+) tobacco use, Current use, 1 packs/day,     Neurologic: Comment: H/o TBI, 3 head injuries about 10 years ago    (+) seizures, last seizure: 5 years ago,     Cardiovascular: Comment: Was incidentally found to have coronary calcifications on imaging- saw Dr. Dee with recommendation to complete stress test     (+) Dyslipidemia hypertension-----Previous cardiac testing   Echo: Date: Results:    Stress Test: Date: Results:    ECG Reviewed: Date: 2014 Results:  NSR  Cath: Date: Results:   (-) taking anticoagulants/antiplatelets   METS/Exercise Tolerance:  Comment: Walks about a mile at a time without exertional symptoms and can ascend a flight of stairs    Hematologic:  - neg hematologic  ROS  (-) history of blood clots and history of blood transfusion   Musculoskeletal:  - neg musculoskeletal ROS     GI/Hepatic:     (+) GERD, Asymptomatic on medication,     Renal/Genitourinary:  - neg Renal ROS     Endo:     (+) type II DM, Last HgA1c: 6.3, date: 11/22/21, Using insulin, - not using insulin pump.  (-) chronic steroid usage   Psychiatric/Substance Use:  - neg psychiatric ROS     Infectious Disease:  - neg infectious disease ROS     Malignancy:       Other:            Virtual visit -  No vitals were obtained    Physical Exam  Constitutional: Awake, alert, cooperative, no apparent distress, and appears stated age.  HENT: Normocephalic  Respiratory: non labored breathing   Neurologic: Awake, alert, oriented to name, place and time.   Neuropsychiatric: Calm, cooperative. Normal affect.      Prior Labs/Diagnostic Studies   All labs and imaging personally reviewed     EKG/ stress test - if available please see in ROS above   No results found.  No flowsheet data found.      The patient's records and results personally reviewed by this provider.     Outside records reviewed from: Care Everywhere      Assessment      Karlo Townsend is a 66 year old male seen as a PAC referral for risk assessment and optimization for anesthesia.    Plan/Recommendations  Pt will be optimized for the proposed procedure.  See below for details on the assessment, risk, and preoperative recommendations    NEUROLOGY  -h/o seizure, most recently >5 years ago. Using keppra and lamictal. Follows with neurology yearly, stable.   -h/o TBI about 10 years ago.     ENT  - No current airway concerns.  Will need to be reassessed day of surgery.  Mallampati: Unable to assess  TM: Unable to assess    CARDIAC  - Hypertension using amlodipine  - Hyperlipidemia using lipitor  - patient was found to have coronary calcifications on CT. Seen by Dr. Dee yesterday with recommendation to complete exercise stress  "test, currently scheduled 4/6/22. Today patient is reporting he can walk 1 mile and ascends stairs at group home without exertional symptoms. Discussed with Dr. Galarza, we recommend he proceed with the above procedure Friday, as there is concern for malignancy, unless cardiology recommends stress test to be completed prior to surgery. Message sent to Dr. Dee for recommendations.   - METS (Metabolic Equivalents)  Patient performs 4 or more METS exercise without symptoms            Total Score: 0      RCRI-Low risk: Class 2 0.9% complication rate            Total Score: 1    RCRI: Diabetes        PULMONARY2  JOSETTE Medium Risk            Total Score: 3    JOSETTE: Hypertension    JOSETTE: Over 50 ys old    JOSETTE: Male      - Denies asthma or inhaler use   - educated not to smoke DOS  -pulmonary nodule with above procedure planned   - Tobacco History      History   Smoking Status     Current Every Day Smoker     Packs/day: 0.50     Years: 40.00     Types: Cigarettes   Smokeless Tobacco     Never Used       GI  - GERD  Controlled on medications: Prilosec  PONV Low Risk  Total Score: 1           1 AN PONV: Intended Post Op Opioids        /RENAL  - Baseline Creatinine  WNL    ENDOCRINE    - BMI: Estimated body mass index is 26.97 kg/m  as calculated from the following:    Height as of 3/22/22: 1.845 m (6' 0.64\").    Weight as of 3/22/22: 91.8 kg (202 lb 6.4 oz).  Overweight (BMI 25.0-29.9)  - Diabetes  Diabetes Type 2, insulin dependent. Take 80% of last scheduled dose of long-acting insulin prior to procedure.  Recommend close monitoring of the patient's blood glucose levels throughout the perioperative period. A1c 6.3 11/22/21    HEME  VTE Low Risk 0.5%            Total Score: 3    VTE: Greater than 59 yrs old    VTE: Male        Patient was discussed with Dr Galarza    The patient is optimized for their procedure, pending stress test timing recommendations from cardiology. AVS with information on surgery time/arrival time, meds and " NPO status given by nursing staff. No further diagnostic testing indicated.    Addendum: received message from Dr. Dee, stress test is being completed to have a baseline. Does not need to be completed before the proceudre Friday. We will alert the patient.     Please refer to the physical examination documented by the anesthesiologist in the anesthesia record on the day of surgery.    Video-Visit Details    Type of service:  Video Visit    Patient verbally consented to video service today: YES    Video Start Time: 1139  Video End Time (time video stopped): 1150    Originating Location (pt. Location): Home    Distant Location (provider location): home    Mode of Communication:  Video Conference via [employer]  On the day of service:     Prep time: 17 minutes  Visit time: 11 minutes  Documentation time: 26 minutes  ------------------------------------------  Total time: 54 minutes      Nellie Warren PA-C  Preoperative Assessment Center  [facility]  [facility]  Phone: [phone]  Fax: [phone]

## 2022-03-23 NOTE — PROGRESS NOTES
Karlo is a 66 year old who is being evaluated via a billable video visit.      How would you like to obtain your AVS? email  If the video visit is dropped, the invitation should be resent by: Send to e-mail at: [email or web address]    HPI         Review of Systems       Physical Exam   RODRIGUEZ Villanueva LPN

## 2022-03-23 NOTE — PATIENT INSTRUCTIONS
Preparing for Your Surgery      Name:  Karlo Townsend   MRN:  [id number]   :  1955   Today's Date:  3/23/2022       Arriving for surgery:  Surgery date:  3/25/22  Arrival time: 09:30 am    Restrictions due to COVID 19       Effective 22 [facility] is implementing the following visitor policy:     1 person may accompany the patient through the Pre-Op process.      That same person may wait in the Surgery Waiting room, provided there is enough room to social distance         Inpatients are allowed 2 visitors per day for the duration of their stay.        Visitors must wear a mask.      Visitors must not be ill.      Visiting hours are 8 am to 8 pm.    [employer] parking is available for anyone with mobility limitations or disabilities.  ([place]  24 hours/ 7 days a week; [facility]  7 am- 3:30 pm, Mon- Fri)    Please come to:     [facility] [address]  [address] [place] [address]    - ?[employer] parking is available in front of the hospital      -    Please proceed to [address] on the 3rd floor. [phone]?     - ?If you are in need of directions, wheelchair or escort please stop at the Information Desk in the lobby.  Inform the information person that you are here for surgery; a wheelchair and escort to [address] will be provided.?     What can I eat or drink?  -  You may eat and drink normally for up to 8 hours before your surgery.   -  You may have clear liquids until 2 hours before surgery.    Examples of clear liquids:  Water  Clear broth  Juices (apple, white grape, white cranberry  and cider) without pulp  Noncarbonated, powder based beverages  (lemonade and Ezra-Aid)  Sodas (Sprite, 7-Up, ginger ale and seltzer)  Coffee or tea (without milk or cream)  Gatorade    -  No Alcohol for at least 24 hours before surgery     Which medicines can I take?    Hold Aspirin for 7 days before surgery.   Hold Multivitamins for 7 days before  surgery.  Hold Supplements for 7 days before surgery.  Hold Ibuprofen (Advil, Motrin) for 1 day before surgery--unless otherwise directed by surgeon.  Hold Naproxen (Aleve) for 4 days before surgery.  Take 17 units of Lantus insulin the night before surgery.  -  PLEASE TAKE these medications the day of surgery:  Tylenol if needed, take other morning medications.    How do I prepare myself?  - Please take 2 showers before surgery using Scrubcare or Hibiclens soap.    Use this soap only from the neck to your toes.     Leave the soap on your skin for one minute--then rinse thoroughly.      You may use your own shampoo and conditioner; no other hair products.   - Please remove all jewelry and body piercings.  - No lotions, deodorants or fragrance.  - No makeup or fingernail polish.   - Bring your ID and insurance card.    -If you have a Deep Brain Stimulator, Spinal Cord Stimulator or any neuro stimulator device---you must bring the remote control to the hospital     - All patients are required to have a Covid-19 test within 4 days of surgery/procedure.      -Patients will be contacted by the [facility] scheduling team within 1 week of surgery to make an appointment.      - Patients may call the Scheduling team at [phone] if they have not been scheduled within 4 days of  surgery.      ALL PATIENTS GOING HOME THE SAME DAY OF SURGERY ARE REQUIRED TO HAVE A RESPONSIBLE ADULT TO DRIVE AND BE IN ATTENDANCE WITH THEM FOR 24 HOURS FOLLOWING SURGERY.      Questions or Concerns:    - For any questions regarding the day of surgery or your hospital stay, please contact the Pre Admission Nursing Office at [phone].       - If you have health changes between today and your surgery please call your surgeon.       For questions after surgery please call your surgeons office.

## 2022-03-23 NOTE — H&P
Pre-Operative H & P     CC:  Preoperative exam to assess for increased cardiopulmonary risk while undergoing surgery and anesthesia.    Date of Encounter: 3/23/2022  Primary Care Physician:  Reji Nguyen     Reason for visit:   Encounter Diagnosis   Name Primary?     Pre-op evaluation Yes       HPI  Karlo Townsend is a 66 year old male who presents for pre-operative H & P in preparation for  Procedure Information     Case: [id number] Date/Time: 03/25/22 1135    Procedure: BRONCHOSCOPY, FIBEROPTIC, endobronchial biopsy, endobronchial ultrasound guided transbronchial biopsy (N/A Bronchus)    Anesthesia type: General    Diagnosis: Lung nodule [R91.1]    Pre-op diagnosis: Lung nodule [R91.1]    [facility]: [facility]  [facility]    Providers: Chela Magana MD          Patient is being evaluated for comorbid conditions of Hypertension, current tobacco use, seizure disorder, h/o TBI, diabetes, GERD    Mr. Townsend has a history of smoking. He was found to have abnormal CT revealing endobronchial lesion in DENISE. He was seen by Dr. Nice for further evaluation and is now scheduled for the above procedure.     History is obtained from the patient and chart review.  Staff member from [facility] was also present, but patient was able to provide medical history independently.     Hx of abnormal bleeding or anti-platelet use: denies      Past Medical History  No past medical history on file.    Past Surgical History  Past Surgical History:   Procedure Laterality Date     APPENDECTOMY       MANDIBLE SURGERY         Prior to Admission Medications  Current Outpatient Medications   Medication Sig Dispense Refill     acetaminophen (TYLENOL) 325 MG tablet Take 325-650 mg by mouth every 6 hours as needed for mild pain       amLODIPine (NORVASC) 10 MG tablet Take 1 tablet (10 mg) by mouth daily (Patient taking differently: Take 10 mg by mouth every morning ) 90 tablet 3     atorvastatin (LIPITOR) 40 MG tablet Take 1 tablet  (40 mg) by mouth daily (Patient taking differently: Take 40 mg by mouth every evening ) 90 tablet 3     diphenhydrAMINE HCl (BENADRYL ALLERGY PO)        ibuprofen (ADVIL/MOTRIN) 200 MG tablet Take 200 mg by mouth every 4 hours as needed for mild pain       LamoTRIgine (LAMICTAL XR) 300 MG 24 hr tablet Take 300 mg by mouth every morning       LANTUS SOLOSTAR 100 UNIT/ML soln INJECT 22 UNITS SUBCUTANEOUSLY ONCE DAILY *DISCARD PEN 28 DAYS AFTER FIRST USE, DO NOT REFRIGERATE AFTER FIRST USE* (Patient taking differently: Inject 22 Units Subcutaneous At Bedtime ) 15 mL 3     levETIRAcetam (KEPPRA) 1000 MG tablet Take 1,000 mg by mouth 2 times daily        loperamide (IMODIUM) 2 MG capsule Take 2 mg by mouth 4 times daily as needed for diarrhea       omeprazole (PRILOSEC) 20 MG DR capsule Take 1 capsule (20 mg) by mouth daily (Patient taking differently: Take 20 mg by mouth every morning ) 90 capsule 3     sodium chloride (OCEAN) 0.65 % nasal spray Spray 1 spray into both nostrils daily as needed for congestion       alcohol swab prep pads Use to swab area of injection/gatito as directed 100 each 3     B-D U/F insulin pen needle USE DAILY. 100 each 1     blood glucose (ACCU-CHEK VANIA PLUS) test strip TEST TWICE DAILY 200 strip 3     blood glucose calibration (NO BRAND SPECIFIED) solution Use to calibrate blood glucose monitor as needed as directed. To accompany: Blood Glucose Monitor Brands: per insurance. 1 Bottle 3     thin (NO BRAND SPECIFIED) lancets Use to test blood sugar 2 times daily or as directed. To accompany: Blood Glucose Monitor Brands: per insurance. 100 each 3       Allergies  Allergies   Allergen Reactions     Valproic Acid Other (See Comments)     Confusion/Sedation     Penicillins        Social History  Social History     Socioeconomic History     Marital status: Single     Spouse name: Not on file     Number of children: Not on file     Years of education: Not on file     Highest education level: Not on  file   Occupational History     Not on file   Tobacco Use     Smoking status: Current Every Day Smoker     Packs/day: 0.50     Years: 40.00     Pack years: 20.00     Types: Cigarettes     Smokeless tobacco: Never Used   Vaping Use     Vaping Use: Never used   Substance and Sexual Activity     Alcohol use: Not Currently     Drug use: Never     Sexual activity: Yes     Partners: Female   Other Topics Concern     Not on file   Social History Narrative     Not on file     Social Determinants of Health     Financial Resource Strain: Not on file   Food Insecurity: Not on file   Transportation Needs: Not on file   Physical Activity: Not on file   Stress: Not on file   Social Connections: Not on file   Intimate Partner Violence: Not on file   Housing Stability: Not on file       Family History  Family History   Problem Relation Age of Onset     Diabetes No family hx of      Glaucoma No family hx of      Macular Degeneration No family hx of      Anesthesia Reaction No family hx of      Deep Vein Thrombosis (DVT) No family hx of        Review of Systems  The complete review of systems is negative other than noted in the HPI or here.   Anesthesia Evaluation   Pt has had prior anesthetic.     No history of anesthetic complications       ROS/MED HX  ENT/Pulmonary:     (+) tobacco use, Current use, 1 packs/day,     Neurologic: Comment: H/o TBI, 3 head injuries about 10 years ago    (+) seizures, last seizure: 5 years ago,     Cardiovascular: Comment: Was incidentally found to have coronary calcifications on imaging- saw Dr. Dee with recommendation to complete stress test     (+) Dyslipidemia hypertension-----Previous cardiac testing   Echo: Date: Results:    Stress Test: Date: Results:    ECG Reviewed: Date: 2014 Results:  NSR  Cath: Date: Results:   (-) taking anticoagulants/antiplatelets   METS/Exercise Tolerance:  Comment: Walks about a mile at a time without exertional symptoms and can ascend a flight of stairs    Hematologic:  - neg hematologic  ROS  (-) history of blood clots and history of blood transfusion   Musculoskeletal:  - neg musculoskeletal ROS     GI/Hepatic:     (+) GERD, Asymptomatic on medication,     Renal/Genitourinary:  - neg Renal ROS     Endo:     (+) type II DM, Last HgA1c: 6.3, date: 11/22/21, Using insulin, - not using insulin pump.  (-) chronic steroid usage   Psychiatric/Substance Use:  - neg psychiatric ROS     Infectious Disease:  - neg infectious disease ROS     Malignancy:       Other:            Virtual visit -  No vitals were obtained    Physical Exam  Constitutional: Awake, alert, cooperative, no apparent distress, and appears stated age.  HENT: Normocephalic  Respiratory: non labored breathing   Neurologic: Awake, alert, oriented to name, place and time.   Neuropsychiatric: Calm, cooperative. Normal affect.      Prior Labs/Diagnostic Studies   All labs and imaging personally reviewed     EKG/ stress test - if available please see in ROS above   No results found.  No flowsheet data found.      The patient's records and results personally reviewed by this provider.     Outside records reviewed from: Care Everywhere      Assessment      Karlo Townsend is a 66 year old male seen as a PAC referral for risk assessment and optimization for anesthesia.    Plan/Recommendations  Pt will be optimized for the proposed procedure.  See below for details on the assessment, risk, and preoperative recommendations    NEUROLOGY  -h/o seizure, most recently >5 years ago. Using keppra and lamictal. Follows with neurology yearly, stable.   -h/o TBI about 10 years ago.     ENT  - No current airway concerns.  Will need to be reassessed day of surgery.  Mallampati: Unable to assess  TM: Unable to assess    CARDIAC  - Hypertension using amlodipine  - Hyperlipidemia using lipitor  - patient was found to have coronary calcifications on CT. Seen by Dr. Dee yesterday with recommendation to complete exercise stress  "test, currently scheduled 4/6/22. Today patient is reporting he can walk 1 mile and ascends stairs at group home without exertional symptoms. Discussed with Dr. Galarza, we recommend he proceed with the above procedure Friday, as there is concern for malignancy, unless cardiology recommends stress test to be completed prior to surgery. Message sent to Dr. Dee for recommendations.   - METS (Metabolic Equivalents)  Patient performs 4 or more METS exercise without symptoms            Total Score: 0      RCRI-Low risk: Class 2 0.9% complication rate            Total Score: 1    RCRI: Diabetes        PULMONARY2  JOSETTE Medium Risk            Total Score: 3    JOSETTE: Hypertension    JOSETTE: Over 50 ys old    JOSETTE: Male      - Denies asthma or inhaler use   - educated not to smoke DOS  -pulmonary nodule with above procedure planned   - Tobacco History      History   Smoking Status     Current Every Day Smoker     Packs/day: 0.50     Years: 40.00     Types: Cigarettes   Smokeless Tobacco     Never Used       GI  - GERD  Controlled on medications: Prilosec  PONV Low Risk  Total Score: 1           1 AN PONV: Intended Post Op Opioids        /RENAL  - Baseline Creatinine  WNL    ENDOCRINE    - BMI: Estimated body mass index is 26.97 kg/m  as calculated from the following:    Height as of 3/22/22: 1.845 m (6' 0.64\").    Weight as of 3/22/22: 91.8 kg (202 lb 6.4 oz).  Overweight (BMI 25.0-29.9)  - Diabetes  Diabetes Type 2, insulin dependent. Take 80% of last scheduled dose of long-acting insulin prior to procedure.  Recommend close monitoring of the patient's blood glucose levels throughout the perioperative period. A1c 6.3 11/22/21    HEME  VTE Low Risk 0.5%            Total Score: 3    VTE: Greater than 59 yrs old    VTE: Male        Patient was discussed with Dr Galarza    The patient is optimized for their procedure, pending stress test timing recommendations from cardiology. AVS with information on surgery time/arrival time, meds and " NPO status given by nursing staff. No further diagnostic testing indicated.    Addendum: received message from Dr. Dee, stress test is being completed to have a baseline. Does not need to be completed before the proceudre Friday. We will alert the patient.     Please refer to the physical examination documented by the anesthesiologist in the anesthesia record on the day of surgery.    Video-Visit Details    Type of service:  Video Visit    Patient verbally consented to video service today: YES    Video Start Time: 1139  Video End Time (time video stopped): 1150    Originating Location (pt. Location): Home    Distant Location (provider location): home    Mode of Communication:  Video Conference via [employer]viv[employer]  On the day of service:     Prep time: 17 minutes  Visit time: 11 minutes  Documentation time: 26 minutes  ------------------------------------------  Total time: 54 minutes      Nellie Warren PA-C  Preoperative Assessment Center  [facility]  [facility]  Phone: [phone]  Fax: [phone]

## 2022-03-23 NOTE — TELEPHONE ENCOUNTER
"Spoke with Milton, home care staff and notified him that, per ASHELY Elder: \"I talked with his cardiologist and his stress test does NOT need to be completed before surgery this Friday. He can proceed as scheduled and complete the stress test in April as scheduled.\"  Milton expressed understanding.  Deepthi Nice RN    "

## 2022-03-24 ASSESSMENT — ENCOUNTER SYMPTOMS: SEIZURES: 1

## 2022-03-24 ASSESSMENT — LIFESTYLE VARIABLES: TOBACCO_USE: 1

## 2022-03-24 NOTE — ANESTHESIA PREPROCEDURE EVALUATION
Anesthesia Pre-Procedure Evaluation    Patient: Karlo Townsend   MRN: [id number] : 1955        Procedure : Procedure(s):  BRONCHOSCOPY, FIBEROPTIC, endobronchial biopsy, endobronchial ultrasound guided transbronchial biopsy          Past Medical History:   Diagnosis Date     Current tobacco use      Diabetes mellitus (H)      Esophageal reflux      HTN (hypertension)      Seizure disorder (H)      TBI (traumatic brain injury) (H)       Past Surgical History:   Procedure Laterality Date     APPENDECTOMY       MANDIBLE SURGERY        Allergies   Allergen Reactions     Valproic Acid Other (See Comments)     Confusion/Sedation     Penicillins       Social History     Tobacco Use     Smoking status: Current Every Day Smoker     Packs/day: 0.50     Years: 40.00     Pack years: 20.00     Types: Cigarettes     Smokeless tobacco: Never Used   Substance Use Topics     Alcohol use: Not Currently      Wt Readings from Last 1 Encounters:   22 91.8 kg (202 lb 6.4 oz)        Anesthesia Evaluation   Pt has had prior anesthetic. Type: General.    No history of anesthetic complications       ROS/MED HX  ENT/Pulmonary:     (+) tobacco use, Current use, 1 packs/day,     Neurologic: Comment: H/o TBI, 3 head injuries about 10 years ago    (+) seizures, last seizure: 5 years ago,     Cardiovascular: Comment: Was incidentally found to have coronary calcifications on imaging- saw Dr. Dee with recommendation to complete stress test     (+) Dyslipidemia hypertension-----Previous cardiac testing   Echo: Date: Results:    Stress Test: Date: Results:    ECG Reviewed: Date:  Results:  NSR  Cath: Date: Results:   (-) taking anticoagulants/antiplatelets   METS/Exercise Tolerance:  Comment: Walks about a mile at a time without exertional symptoms and can ascend a flight of stairs   Hematologic:  - neg hematologic  ROS  (-) history of blood clots and history of blood transfusion   Musculoskeletal:  - neg musculoskeletal ROS      GI/Hepatic:     (+) GERD, Asymptomatic on medication,     Renal/Genitourinary:  - neg Renal ROS     Endo:     (+) type II DM, Last HgA1c: 6.3, date: 11/22/21, Using insulin, - not using insulin pump.  (-) chronic steroid usage   Psychiatric/Substance Use:  - neg psychiatric ROS     Infectious Disease:  - neg infectious disease ROS     Malignancy:       Other:            Physical Exam    Airway        Mallampati: II   TM distance: > 3 FB   Neck ROM: full   Mouth opening: > 3 cm    Respiratory Devices and Support         Dental       (+) upper dentures and lower dentures      Cardiovascular   cardiovascular exam normal          Pulmonary   pulmonary exam normal                OUTSIDE LABS:  CBC: No results found for: WBC, HGB, HCT, PLT  BMP:   Lab Results   Component Value Date     11/22/2021     11/20/2020    POTASSIUM 4.0 11/22/2021    POTASSIUM 4.0 11/20/2020    CHLORIDE 104 11/22/2021    CHLORIDE 106 11/20/2020    CO2 28 11/22/2021    CO2 32 11/20/2020    BUN 13 11/22/2021    BUN 13 11/20/2020    CR 1.09 11/22/2021    CR 1.06 11/20/2020    GLC 93 11/22/2021    GLC 88 11/20/2020     COAGS: No results found for: PTT, INR, FIBR  POC: No results found for: BGM, HCG, HCGS  HEPATIC:   Lab Results   Component Value Date    ALBUMIN 4.3 11/20/2020    PROTTOTAL 7.8 11/20/2020    ALT 51 11/20/2020    AST 30 11/20/2020    ALKPHOS 136 11/20/2020    BILITOTAL 0.5 11/20/2020     OTHER:   Lab Results   Component Value Date    A1C 6.3 (H) 11/22/2021    MELISSA 9.3 11/22/2021       Anesthesia Plan    ASA Status:  3      Anesthesia Type: General.     - Airway: ETT   Induction: Intravenous, Propofol.   Maintenance: Balanced.        Consents    Anesthesia Plan(s) and associated risks, benefits, and realistic alternatives discussed. Questions answered and patient/representative(s) expressed understanding.    - Discussed:     - Discussed with:  Patient      - Extended Intubation/Ventilatory Support Discussed: No.      -  Patient is DNR/DNI Status: No    Use of blood products discussed: No .     Postoperative Care    Pain management: IV analgesics.   PONV prophylaxis: Ondansetron (or other 5HT-3), Dexamethasone or Solumedrol     Comments:                Jaime Elaine MD

## 2022-03-25 ENCOUNTER — ANESTHESIA (OUTPATIENT)
Dept: SURGERY | Facility: CLINIC | Age: 67
End: 2022-03-25
Payer: COMMERCIAL

## 2022-03-25 ENCOUNTER — HOSPITAL ENCOUNTER (OUTPATIENT)
Facility: CLINIC | Age: 67
Discharge: HOME | End: 2022-03-25
Attending: INTERNAL MEDICINE | Admitting: INTERNAL MEDICINE
Payer: COMMERCIAL

## 2022-03-25 VITALS
SYSTOLIC BLOOD PRESSURE: 136 MMHG | DIASTOLIC BLOOD PRESSURE: 62 MMHG | BODY MASS INDEX: 25.97 KG/M2 | RESPIRATION RATE: 16 BRPM | HEART RATE: 77 BPM | WEIGHT: 202.38 LBS | HEIGHT: 74 IN | OXYGEN SATURATION: 91 % | TEMPERATURE: 98.5 F

## 2022-03-25 LAB
GLUCOSE BLDC GLUCOMTR-MCNC: 86 MG/DL (ref 70–99)
GLUCOSE BLDC GLUCOMTR-MCNC: 90 MG/DL (ref 70–99)
HGB BLD-MCNC: 14 G/DL (ref 13.3–17.7)

## 2022-03-25 PROCEDURE — 250N000009 HC RX 250: Performed by: STUDENT IN AN ORGANIZED HEALTH CARE EDUCATION/TRAINING PROGRAM

## 2022-03-25 PROCEDURE — 258N000003 HC RX IP 258 OP 636: Performed by: ANESTHESIOLOGY

## 2022-03-25 PROCEDURE — 82962 GLUCOSE BLOOD TEST: CPT

## 2022-03-25 PROCEDURE — 250N000009 HC RX 250: Performed by: ANESTHESIOLOGY

## 2022-03-25 PROCEDURE — 710N000012 HC RECOVERY PHASE 2, PER MINUTE: Performed by: INTERNAL MEDICINE

## 2022-03-25 PROCEDURE — 250N000011 HC RX IP 250 OP 636: Performed by: ANESTHESIOLOGY

## 2022-03-25 PROCEDURE — 710N000009 HC RECOVERY PHASE 1, LEVEL 1, PER MIN: Performed by: INTERNAL MEDICINE

## 2022-03-25 PROCEDURE — 370N000017 HC ANESTHESIA TECHNICAL FEE, PER MIN: Performed by: INTERNAL MEDICINE

## 2022-03-25 PROCEDURE — 31622 DX BRONCHOSCOPE/WASH: CPT | Performed by: INTERNAL MEDICINE

## 2022-03-25 PROCEDURE — 360N000083 HC SURGERY LEVEL 3 W/ FLUORO, PER MIN: Performed by: INTERNAL MEDICINE

## 2022-03-25 PROCEDURE — 250N000025 HC SEVOFLURANE, PER MIN: Performed by: INTERNAL MEDICINE

## 2022-03-25 PROCEDURE — 85018 HEMOGLOBIN: CPT | Performed by: PHYSICIAN ASSISTANT

## 2022-03-25 PROCEDURE — 36415 COLL VENOUS BLD VENIPUNCTURE: CPT | Performed by: PHYSICIAN ASSISTANT

## 2022-03-25 PROCEDURE — 272N000001 HC OR GENERAL SUPPLY STERILE: Performed by: INTERNAL MEDICINE

## 2022-03-25 PROCEDURE — 999N000141 HC STATISTIC PRE-PROCEDURE NURSING ASSESSMENT: Performed by: INTERNAL MEDICINE

## 2022-03-25 RX ORDER — MEPERIDINE HYDROCHLORIDE 25 MG/ML
12.5 INJECTION INTRAMUSCULAR; INTRAVENOUS; SUBCUTANEOUS
Status: DISCONTINUED | OUTPATIENT
Start: 2022-03-25 | End: 2022-03-25 | Stop reason: HOSPADM

## 2022-03-25 RX ORDER — SODIUM CHLORIDE, SODIUM LACTATE, POTASSIUM CHLORIDE, CALCIUM CHLORIDE 600; 310; 30; 20 MG/100ML; MG/100ML; MG/100ML; MG/100ML
INJECTION, SOLUTION INTRAVENOUS CONTINUOUS PRN
Status: DISCONTINUED | OUTPATIENT
Start: 2022-03-25 | End: 2022-03-25

## 2022-03-25 RX ORDER — FENTANYL CITRATE 50 UG/ML
25 INJECTION, SOLUTION INTRAMUSCULAR; INTRAVENOUS
Status: DISCONTINUED | OUTPATIENT
Start: 2022-03-25 | End: 2022-03-25 | Stop reason: HOSPADM

## 2022-03-25 RX ORDER — LABETALOL HYDROCHLORIDE 5 MG/ML
10 INJECTION, SOLUTION INTRAVENOUS
Status: DISCONTINUED | OUTPATIENT
Start: 2022-03-25 | End: 2022-03-25 | Stop reason: HOSPADM

## 2022-03-25 RX ORDER — HYDROMORPHONE HYDROCHLORIDE 1 MG/ML
0.2 INJECTION, SOLUTION INTRAMUSCULAR; INTRAVENOUS; SUBCUTANEOUS EVERY 5 MIN PRN
Status: DISCONTINUED | OUTPATIENT
Start: 2022-03-25 | End: 2022-03-25 | Stop reason: HOSPADM

## 2022-03-25 RX ORDER — ESMOLOL HYDROCHLORIDE 10 MG/ML
INJECTION INTRAVENOUS PRN
Status: DISCONTINUED | OUTPATIENT
Start: 2022-03-25 | End: 2022-03-25

## 2022-03-25 RX ORDER — SODIUM CHLORIDE, SODIUM LACTATE, POTASSIUM CHLORIDE, CALCIUM CHLORIDE 600; 310; 30; 20 MG/100ML; MG/100ML; MG/100ML; MG/100ML
INJECTION, SOLUTION INTRAVENOUS CONTINUOUS
Status: DISCONTINUED | OUTPATIENT
Start: 2022-03-25 | End: 2022-03-25 | Stop reason: HOSPADM

## 2022-03-25 RX ORDER — IPRATROPIUM BROMIDE AND ALBUTEROL SULFATE 2.5; .5 MG/3ML; MG/3ML
3 SOLUTION RESPIRATORY (INHALATION) ONCE
Status: COMPLETED | OUTPATIENT
Start: 2022-03-25 | End: 2022-03-25

## 2022-03-25 RX ORDER — LIDOCAINE 40 MG/G
CREAM TOPICAL
Status: DISCONTINUED | OUTPATIENT
Start: 2022-03-25 | End: 2022-03-25 | Stop reason: HOSPADM

## 2022-03-25 RX ORDER — PROPOFOL 10 MG/ML
INJECTION, EMULSION INTRAVENOUS PRN
Status: DISCONTINUED | OUTPATIENT
Start: 2022-03-25 | End: 2022-03-25

## 2022-03-25 RX ORDER — OXYCODONE HYDROCHLORIDE 5 MG/1
5 TABLET ORAL EVERY 4 HOURS PRN
Status: DISCONTINUED | OUTPATIENT
Start: 2022-03-25 | End: 2022-03-25 | Stop reason: HOSPADM

## 2022-03-25 RX ORDER — FENTANYL CITRATE 50 UG/ML
25 INJECTION, SOLUTION INTRAMUSCULAR; INTRAVENOUS EVERY 5 MIN PRN
Status: DISCONTINUED | OUTPATIENT
Start: 2022-03-25 | End: 2022-03-25 | Stop reason: HOSPADM

## 2022-03-25 RX ORDER — LIDOCAINE HYDROCHLORIDE 20 MG/ML
INJECTION, SOLUTION INFILTRATION; PERINEURAL PRN
Status: DISCONTINUED | OUTPATIENT
Start: 2022-03-25 | End: 2022-03-25

## 2022-03-25 RX ORDER — HALOPERIDOL 5 MG/ML
1 INJECTION INTRAMUSCULAR
Status: DISCONTINUED | OUTPATIENT
Start: 2022-03-25 | End: 2022-03-25 | Stop reason: HOSPADM

## 2022-03-25 RX ORDER — ONDANSETRON 2 MG/ML
INJECTION INTRAMUSCULAR; INTRAVENOUS PRN
Status: DISCONTINUED | OUTPATIENT
Start: 2022-03-25 | End: 2022-03-25

## 2022-03-25 RX ORDER — PROPOFOL 10 MG/ML
INJECTION, EMULSION INTRAVENOUS CONTINUOUS PRN
Status: DISCONTINUED | OUTPATIENT
Start: 2022-03-25 | End: 2022-03-25

## 2022-03-25 RX ORDER — DIAZEPAM 10 MG/2ML
2.5 INJECTION, SOLUTION INTRAMUSCULAR; INTRAVENOUS
Status: DISCONTINUED | OUTPATIENT
Start: 2022-03-25 | End: 2022-03-25 | Stop reason: HOSPADM

## 2022-03-25 RX ORDER — ONDANSETRON 2 MG/ML
4 INJECTION INTRAMUSCULAR; INTRAVENOUS EVERY 30 MIN PRN
Status: DISCONTINUED | OUTPATIENT
Start: 2022-03-25 | End: 2022-03-25 | Stop reason: HOSPADM

## 2022-03-25 RX ORDER — HYDRALAZINE HYDROCHLORIDE 20 MG/ML
2.5-5 INJECTION INTRAMUSCULAR; INTRAVENOUS EVERY 10 MIN PRN
Status: DISCONTINUED | OUTPATIENT
Start: 2022-03-25 | End: 2022-03-25 | Stop reason: HOSPADM

## 2022-03-25 RX ORDER — ONDANSETRON 4 MG/1
4 TABLET, ORALLY DISINTEGRATING ORAL EVERY 30 MIN PRN
Status: DISCONTINUED | OUTPATIENT
Start: 2022-03-25 | End: 2022-03-25 | Stop reason: HOSPADM

## 2022-03-25 RX ADMIN — ESMOLOL HYDROCHLORIDE 80 MG: 10 INJECTION, SOLUTION INTRAVENOUS at 14:21

## 2022-03-25 RX ADMIN — PROPOFOL 200 MCG/KG/MIN: 10 INJECTION, EMULSION INTRAVENOUS at 14:18

## 2022-03-25 RX ADMIN — SODIUM CHLORIDE, POTASSIUM CHLORIDE, SODIUM LACTATE AND CALCIUM CHLORIDE: 600; 310; 30; 20 INJECTION, SOLUTION INTRAVENOUS at 14:05

## 2022-03-25 RX ADMIN — ROCURONIUM BROMIDE 50 MG: 50 INJECTION, SOLUTION INTRAVENOUS at 14:17

## 2022-03-25 RX ADMIN — SUGAMMADEX 200 MG: 100 INJECTION, SOLUTION INTRAVENOUS at 14:36

## 2022-03-25 RX ADMIN — LIDOCAINE HYDROCHLORIDE 60 MG: 20 INJECTION, SOLUTION INFILTRATION; PERINEURAL at 14:16

## 2022-03-25 RX ADMIN — IPRATROPIUM BROMIDE AND ALBUTEROL SULFATE 3 ML: 2.5; .5 SOLUTION RESPIRATORY (INHALATION) at 15:38

## 2022-03-25 RX ADMIN — PROPOFOL 200 MG: 10 INJECTION, EMULSION INTRAVENOUS at 14:16

## 2022-03-25 RX ADMIN — ONDANSETRON 4 MG: 2 INJECTION INTRAMUSCULAR; INTRAVENOUS at 14:25

## 2022-03-25 NOTE — DISCHARGE INSTRUCTIONS
Post-Bronchoscopy Patient Instructions:    March 25, 2022  Karlo Townsend      Your procedure ( bronchoscopy ) was completed without any immediate complications.      You may cough up scant amount of blood for the next 12-24 hours. If you have excessive cough with blood, chest pain, shortness of breath or other concerning symptoms, please report to the closest emergency room.      You may experience low grade (less than 100.5 F) fever next 24 hours for which you can take Tylenol. If the fever persists more than 24 hours contact our office or your primary care provider.      Our office (Pulmonary--[phone])       You  resume your regular diet as it was prior to procedure.      Should you have any question, please do not hesitate to call our office.    Martha Irving MD

## 2022-03-25 NOTE — PROCEDURES
INTERVENTIONAL PULMONOLOGY       Procedure(s):    A flexible bronchoscopy  Airway exam    Indication:   C/f endobronchial cancer     Attending of Record:  Chela Magana MD     Interventional Pulmonary Fellow   Martha Irving MD     Trainees Present:   None     Medications:    General Anesthesia - See anesthesia flowsheet for details    Sedation Time:   Per Anesthesia Care Provider    Time Out:  Performed    The patient's medical record has been reviewed.  The indication for the procedure was reviewed.  The necessary history and physical examination was performed and reviewed.  The risks, benefits and alternatives of the procedure were discussed with the the patient in detail and he had the opportunity to ask questions.  I discussed in particular the potential complications including risks of minor or life-threatening bleeding and/or infection, respiratory failure, vocal cord trauma / paralysis, pneumothorax, and discomfort. Sedation risks were also discussed including abnormal heart rhythms, low blood pressure, and respiratory failure. All questions were answered to the best of my ability.  Verbal and written informed consent was obtained.  The proposed procedure and the patient's identification were verified prior to the procedure by the physician and the nurse.    The patient was assessed for the adequacy for the procedure and to receive medications.   Mental Status:  Alert and oriented x 3  Airway examination:  Class II (Complete visualization of uvula)  Pulmonary:  Decreased breathsounds in bilateral bases  CV:  RRR, no murmurs or gallops  ASA Grade:  (II)  Mild systemic disease    After clinical evaluation and reviewing the indication, risks, alternatives and benefits of the procedure the patient was deemed to be in satisfactory condition to undergo the procedure.      Immediately before administration of medications the patient was re-assessed for adequacy to receive sedatives including the heart rate,  respiratory rate, mental status, oxygen saturation, blood pressure and adequacy of pulmonary ventilation. These same parameters were continuously monitored throughout the procedure.    A Tuberculosis risk assessment was performed:  The patient has no known RISK of Tuberculosis    The procedure was performed in a negative airflow room: The patient could not be moved to a negative airflow room because of no risk of TB    Maneuvers / Procedure:      A Flexible  bronchoscope was used for the procedure. The patient was orally intubated with a # 8.5 ETT and the flexible scope was passed through.      Airway Examination: A complete airway examination was performed from the distal trachea to the subsegmental level in each lobe of both lungs.  Pertinent findings include bronchoscope advanced to bilateral lungs down to subsegmental anatomy , no evidence of endobronchial tumor . Thick scattered secretions .     Any disposable equipment was visually inspected and deemed to be intact immediately post procedure.      Relevant Pictures    RML , RLL - scattered secretions       LLL       DENISE , lingula       Lingula         Recommendations:     No evidence of endobronchial lesions  , thick scattered secretions . Suspect the area of concern is due to anatomical structures and potentially secretions .     -->  Further management per primary pulmonologist       MD Ericka Saeed  Interventional Pulmonology Surgery Scheduler  Office: [phone]  Email: [email or web address]

## 2022-03-25 NOTE — INTERVAL H&P NOTE
"I have reviewed the surgical (or preoperative) H&P that is linked to this encounter, and examined the patient. There are no significant changes    Clinical Conditions Present on Arrival:  SECTIONPRESENTONADMISSIONBEGIN@                   # Overweight: Estimated body mass index is 26.97 kg/m  as calculated from the following:    Height as of 3/22/22: 1.845 m (6' 0.64\").    Weight as of 3/22/22: 91.8 kg (202 lb 6.4 oz).       "

## 2022-03-25 NOTE — ANESTHESIA CARE TRANSFER NOTE
Patient: Karlo Townsend    Procedure: Procedure(s):  flexible bronchoscopy, therapeutic suctioning       Diagnosis: Lung nodule [R91.1]  Diagnosis Additional Information: No value filed.    Anesthesia Type:   General     Note:    Oropharynx: oropharynx clear of all foreign objects and spontaneously breathing  Level of Consciousness: awake  Oxygen Supplementation: face mask  Level of Supplemental Oxygen (L/min / FiO2): 4  Independent Airway: airway patency satisfactory and stable    Vital Signs Stable: post-procedure vital signs reviewed and stable  Report to RN Given: handoff report given  Patient transferred to: PACU    Handoff Report: Identifed the Patient, Identified the Reponsible Provider, Reviewed the pertinent medical history, Discussed the surgical course, Reviewed Intra-OP anesthesia mangement and issues during anesthesia, Set expectations for post-procedure period and Allowed opportunity for questions and acknowledgement of understanding      Vitals:  Vitals Value Taken Time   /70 03/25/22 1445   Temp 36.1  C (97  F) 03/25/22 1445   Pulse 85 03/25/22 1448   Resp 16 03/25/22 1448   SpO2 98 % 03/25/22 1448   Vitals shown include unvalidated device data.    Electronically Signed By: JENNIFER Worthington CRNA  March 25, 2022  2:49 PM

## 2022-03-25 NOTE — PROGRESS NOTES
"Pt becoming increasingly restless, refusing O2 and completing duo neb, stating 'I'm going to start tearing all this stuff off\". Pt O2 sats 88-92% on RA. Team paged x2.   "

## 2022-03-25 NOTE — ANESTHESIA PROCEDURE NOTES
Airway       Patient location during procedure: OR       Procedure Start/Stop Times: 3/25/2022 2:20 PM  Staff -        CRNA: Christine Oconnell APRN CRNA       Performed By: CRNAIndications and Patient Condition       Indications for airway management: jag-procedural       Induction type:intravenous       Mask difficulty assessment: 2 - vent by mask + OA or adjuvant +/- NMBA    Final Airway Details       Final airway type: endotracheal airway       Successful airway: ETT - single  Endotracheal Airway Details        ETT size (mm): 8.5       Cuffed: yes       Cuff volume (mL): 8       Successful intubation technique: video laryngoscopy       VL Blade Size: MAC 4       Grade View of Cords: 1       Adjucts: stylet       Position: Right       Measured from: lips       Secured at (cm): 23    Post intubation assessment        Placement verified by: capnometry, equal breath sounds and chest rise        Number of attempts at approach: 1       Ease of procedure: easy       Dentition: Intact and Unchanged

## 2022-03-25 NOTE — ANESTHESIA POSTPROCEDURE EVALUATION
Patient: Karlo Townsend    Procedure: Procedure(s):  flexible bronchoscopy, therapeutic suctioning       Anesthesia Type:  General    Note:  Disposition: Outpatient   Postop Pain Control: Uneventful            Sign Out: Well controlled pain   PONV: No   Neuro/Psych: Uneventful            Sign Out: Acceptable/Baseline neuro status   Airway/Respiratory: Uneventful            Sign Out: Acceptable/Baseline resp. status   CV/Hemodynamics: Uneventful            Sign Out: Acceptable CV status; No obvious hypovolemia; No obvious fluid overload   Other NRE: NONE   DID A NON-ROUTINE EVENT OCCUR? No           Last vitals:  Vitals Value Taken Time   /77 03/25/22 1530   Temp 36.1  C (97  F) 03/25/22 1445   Pulse 74 03/25/22 1536   Resp 0 03/25/22 1515   SpO2 93 % 03/25/22 1536   Vitals shown include unvalidated device data.    Electronically Signed By: Jaskaran Owens III, MD  March 25, 2022  3:37 PM

## 2022-03-29 ENCOUNTER — PATIENT OUTREACH (OUTPATIENT)
Dept: PULMONOLOGY | Facility: CLINIC | Age: 67
End: 2022-03-29
Payer: COMMERCIAL

## 2022-03-29 NOTE — PROGRESS NOTES
Called the patient's home number and informed the  who was working with the patient that his bronchoscopy came back normal and he doesn't need further intervention with our team.  The DSP said she would tell the patient.

## 2022-05-25 ENCOUNTER — OFFICE VISIT (OUTPATIENT)
Dept: FAMILY MEDICINE | Facility: CLINIC | Age: 67
End: 2022-05-25
Payer: COMMERCIAL

## 2022-05-25 VITALS
TEMPERATURE: 98.2 F | OXYGEN SATURATION: 98 % | HEART RATE: 82 BPM | SYSTOLIC BLOOD PRESSURE: 138 MMHG | RESPIRATION RATE: 16 BRPM | BODY MASS INDEX: 26.69 KG/M2 | HEIGHT: 74 IN | DIASTOLIC BLOOD PRESSURE: 64 MMHG | WEIGHT: 208 LBS

## 2022-05-25 DIAGNOSIS — Z23 COVID-19 VACCINE ADMINISTERED: ICD-10-CM

## 2022-05-25 DIAGNOSIS — Z12.11 SCREEN FOR COLON CANCER: ICD-10-CM

## 2022-05-25 DIAGNOSIS — G40.909 SEIZURE DISORDER (H): ICD-10-CM

## 2022-05-25 DIAGNOSIS — I10 ESSENTIAL HYPERTENSION: ICD-10-CM

## 2022-05-25 DIAGNOSIS — Z79.4 TYPE 2 DIABETES MELLITUS WITHOUT COMPLICATION, WITH LONG-TERM CURRENT USE OF INSULIN (H): Primary | ICD-10-CM

## 2022-05-25 DIAGNOSIS — I25.10 CORONARY ARTERY CALCIFICATION: ICD-10-CM

## 2022-05-25 DIAGNOSIS — E11.9 TYPE 2 DIABETES MELLITUS WITHOUT COMPLICATION, WITH LONG-TERM CURRENT USE OF INSULIN (H): Primary | ICD-10-CM

## 2022-05-25 LAB — HBA1C MFR BLD: 7 % (ref 0–5.6)

## 2022-05-25 PROCEDURE — 99214 OFFICE O/P EST MOD 30 MIN: CPT | Mod: 25 | Performed by: FAMILY MEDICINE

## 2022-05-25 PROCEDURE — 83036 HEMOGLOBIN GLYCOSYLATED A1C: CPT | Performed by: FAMILY MEDICINE

## 2022-05-25 PROCEDURE — 0064A COVID-19,PF,MODERNA (18+ YRS BOOSTER .25ML): CPT | Performed by: FAMILY MEDICINE

## 2022-05-25 PROCEDURE — 91306 COVID-19,PF,MODERNA (18+ YRS BOOSTER .25ML): CPT | Performed by: FAMILY MEDICINE

## 2022-05-25 PROCEDURE — 99207 PR FOOT EXAM NO CHARGE: CPT | Performed by: FAMILY MEDICINE

## 2022-05-25 PROCEDURE — 36415 COLL VENOUS BLD VENIPUNCTURE: CPT | Performed by: FAMILY MEDICINE

## 2022-05-25 NOTE — PROGRESS NOTES
"  Assessment & Plan     Type 2 diabetes mellitus without complication, with long-term current use of insulin (H)  Mild worsening of a1c. Home readings are excellent. Continue to monitor.               - FOOT EXAM  - Hemoglobin A1c; Future  - Hemoglobin A1c    Coronary artery calcification  Smoking. On statin. Monitor.     Essential hypertension  bp stable.     Screen for colon cancer     - OKSANA([employer])    Seizure disorder (H)   recent seizures after anaesthesia.     COVID-19 vaccine administered     - COVID-19,PF,MODERNA (18+ YRS BOOSTER .25ML)             BMI:   Estimated body mass index is 26.71 kg/m  as calculated from the following:    Height as of this encounter: 1.88 m (6' 2\").    Weight as of this encounter: 94.3 kg (208 lb).   Weight management plan: Discussed healthy diet and exercise guidelines    Written instructions for group home given.     Return in about 6 months (around 11/25/2022).    Reji Nguyen MD, MD  [facility]    Natalia Dietrich is a 66 year old who presents for the following health issues     History of Present Illness       Diabetes:   He presents for follow up of diabetes.  He is checking home blood glucose one time daily. He checks blood glucose before meals.  Blood glucose is never over 200 and never under 70. When his blood glucose is low, the patient is asymptomatic for confusion, blurred vision, lethargy and reports not feeling dizzy, shaky, or weak.  He has no concerns regarding his diabetes at this time.  He is not experiencing numbness or burning in feet, excessive thirst, blurry vision, weight changes or redness, sores or blisters on feet. The patient has not had a diabetic eye exam in the last 12 months.         Hypertension: He presents for follow up of hypertension.  He does check blood pressure  regularly outside of the clinic. Outpatient blood pressures have not been over 140/90. He follows a low salt diet.     He eats " "2-3 servings of fruits and vegetables daily.He consumes 1 sweetened beverage(s) daily.He exercises with enough effort to increase his heart rate 9 or less minutes per day.  He exercises with enough effort to increase his heart rate 3 or less days per week.   He is taking medications regularly.     March 25th seizures after coming out of anaesthesia. Seeing neurologist in near future.     Coronary artery calcification - does not want to go for stress test. Admits he is 67 and wishes to accept whatever happens with his heart. Does not want stress test or further workup.     Here with group home staff.             Review of Systems         Objective    /64 (BP Location: Right arm, Patient Position: Sitting, Cuff Size: Adult Regular)   Pulse 82   Temp 98.2  F (36.8  C) (Temporal)   Resp 16   Ht 1.88 m (6' 2\")   Wt 94.3 kg (208 lb)   SpO2 98%   BMI 26.71 kg/m    Body mass index is 26.71 kg/m .  Physical Exam   DIABETIC FOOT EXAM: normal DP and PT pulses, no trophic changes or ulcerative lesions and normal sensory exam                "

## 2022-05-25 NOTE — LETTER
June 7, 2022      Karlo Townsend  [address] [address]        Dear ,      We are writing to inform you of your test results. Unfortunately your A1c is gradually getting worse.  Continue to watch what you eat.  Your morning home blood sugar readings are excellent but I suspect throughout the day your blood sugar may be spiking up.  Continue to avoid excessive carbohydrate in the diet.  Add more fruits and vegetables and continue with physical activity as tolerated.  We will recheck your A1c in 6 months and if it is keep getting worse You we should consider diabetes medication.       Resulted Orders   Hemoglobin A1c   Result Value Ref Range    Hemoglobin A1C 7.0 (H) 0.0 - 5.6 %      Comment:      Normal <5.7%   Prediabetes 5.7-6.4%    Diabetes 6.5% or higher     Note: Adopted from ADA consensus guidelines.       If you have any questions or concerns, please call the clinic at the number listed above.       Sincerely,      Reji Nguyen MD

## 2022-05-25 NOTE — PATIENT INSTRUCTIONS
Check blood pressure twice per month.   Notify if blood pressure is persistently above 150/95.     Patient understood risk of not doing stress test. OK to cancel stress test order.     Ok to wait on podiatry for now.     Follow up in 6 months.

## 2022-06-15 DIAGNOSIS — E11.9 TYPE 2 DIABETES MELLITUS WITHOUT COMPLICATION, WITH LONG-TERM CURRENT USE OF INSULIN (H): ICD-10-CM

## 2022-06-15 DIAGNOSIS — Z79.4 TYPE 2 DIABETES MELLITUS WITHOUT COMPLICATION, WITH LONG-TERM CURRENT USE OF INSULIN (H): ICD-10-CM

## 2022-06-16 RX ORDER — FLURBIPROFEN SODIUM 0.3 MG/ML
SOLUTION/ DROPS OPHTHALMIC DAILY
Qty: 100 EACH | Refills: 1 | Status: SHIPPED | OUTPATIENT
Start: 2022-06-16 | End: 2023-02-24

## 2022-07-22 DIAGNOSIS — Z79.4 TYPE 2 DIABETES MELLITUS WITHOUT COMPLICATION, WITH LONG-TERM CURRENT USE OF INSULIN (H): ICD-10-CM

## 2022-07-22 DIAGNOSIS — E11.9 TYPE 2 DIABETES MELLITUS WITHOUT COMPLICATION, WITH LONG-TERM CURRENT USE OF INSULIN (H): ICD-10-CM

## 2022-07-26 RX ORDER — LANCETS
EACH MISCELLANEOUS
Qty: 100 EACH | Refills: 3 | Status: SHIPPED | OUTPATIENT
Start: 2022-07-26 | End: 2022-11-29

## 2022-07-26 NOTE — TELEPHONE ENCOUNTER
Routing refill request to provider for review/approval because:  --Ordered by Raina.    --Last Written Prescription Date:     Disp Refills Start End JOSÉ MIGUEL   thin (NO BRAND SPECIFIED) lancets 100 each 3 4/30/2021  No   Sig: Use to test blood sugar 2 times daily or as directed. To accompany: Blood Glucose Monitor Brands: per insurance.         --Last visit:  5/25/2022 Wendy for diabetes.                  
patient

## 2022-08-31 ENCOUNTER — MYC MEDICAL ADVICE (OUTPATIENT)
Dept: FAMILY MEDICINE | Facility: CLINIC | Age: 67
End: 2022-08-31

## 2022-08-31 ENCOUNTER — MYC REFILL (OUTPATIENT)
Dept: FAMILY MEDICINE | Facility: CLINIC | Age: 67
End: 2022-08-31

## 2022-08-31 DIAGNOSIS — Z79.4 TYPE 2 DIABETES MELLITUS WITHOUT COMPLICATION, WITH LONG-TERM CURRENT USE OF INSULIN (H): ICD-10-CM

## 2022-08-31 DIAGNOSIS — E11.9 TYPE 2 DIABETES MELLITUS WITHOUT COMPLICATION, WITH LONG-TERM CURRENT USE OF INSULIN (H): ICD-10-CM

## 2022-08-31 NOTE — TELEPHONE ENCOUNTER
Writer responded via [employer].    ANAYELI SalazarN, RN  [facility]

## 2022-09-01 RX ORDER — INSULIN GLARGINE 100 [IU]/ML
INJECTION, SOLUTION SUBCUTANEOUS
Qty: 30 ML | Refills: 0 | Status: SHIPPED | OUTPATIENT
Start: 2022-09-01 | End: 2022-11-29

## 2022-09-01 NOTE — TELEPHONE ENCOUNTER
Plan per 5/25/22 office visit was to follow up in 6 months.    Prescription approved per [facility] Refill Protocol.  ANAYELI SalazarN, RN  [facility]

## 2022-09-06 ENCOUNTER — MEDICAL CORRESPONDENCE (OUTPATIENT)
Dept: HEALTH INFORMATION MANAGEMENT | Facility: CLINIC | Age: 67
End: 2022-09-06

## 2022-09-06 ENCOUNTER — TELEPHONE (OUTPATIENT)
Dept: FAMILY MEDICINE | Facility: CLINIC | Age: 67
End: 2022-09-06

## 2022-09-06 NOTE — TELEPHONE ENCOUNTER
Faxed to [phone]  Copy sent to abstract  Copy kept in clinic   Pt called to cancel his procedure on 3/29 because he is having Kidney issues and has to start dialysis. He will call back to R/S once he is ready.

## 2022-09-06 NOTE — TELEPHONE ENCOUNTER
Need a form for Medicare completed for his glucose testing supplies.  Needs to go on his Medicare.  Form was faxed last Thursday and Friday.  Verified correct fax.  Please locate and give form to Dr Nguyen and team.  Abbey Goldberg RN  [facility]

## 2022-09-30 ENCOUNTER — MYC MEDICAL ADVICE (OUTPATIENT)
Dept: FAMILY MEDICINE | Facility: CLINIC | Age: 67
End: 2022-09-30

## 2022-09-30 DIAGNOSIS — E11.9 TYPE 2 DIABETES MELLITUS WITHOUT COMPLICATION, WITH LONG-TERM CURRENT USE OF INSULIN (H): Primary | ICD-10-CM

## 2022-09-30 DIAGNOSIS — Z79.4 TYPE 2 DIABETES MELLITUS WITHOUT COMPLICATION, WITH LONG-TERM CURRENT USE OF INSULIN (H): Primary | ICD-10-CM

## 2022-09-30 NOTE — TELEPHONE ENCOUNTER
Dr. Nguyen:     I recently turned 67, and my insurance changed to [facility] [employer].  They required me to get a new glucometer.  Upon getting it.  They wanted to charge me a co-pay, but found out there isn't a co-pay with a doctor's authorization sent to them.  Would you be able to do that for me?    [employer] message sent to patient for clarification.  Response: [employer] just said to ask the doctor to send authorization to them, for the use of the glucometer.    Pended new order, though I note that a new order was sent on 8/31/22, after his [birth year] on [birth year].    ANGELICA Rene RN  [facility]

## 2022-11-04 DIAGNOSIS — Z79.4 TYPE 2 DIABETES MELLITUS WITHOUT COMPLICATION, WITH LONG-TERM CURRENT USE OF INSULIN (H): ICD-10-CM

## 2022-11-04 DIAGNOSIS — E11.9 TYPE 2 DIABETES MELLITUS WITHOUT COMPLICATION, WITH LONG-TERM CURRENT USE OF INSULIN (H): ICD-10-CM

## 2022-11-07 RX ORDER — ATORVASTATIN CALCIUM 40 MG/1
TABLET, FILM COATED ORAL
Qty: 31 TABLET | Refills: 0 | Status: SHIPPED | OUTPATIENT
Start: 2022-11-07 | End: 2022-11-29

## 2022-11-07 NOTE — TELEPHONE ENCOUNTER
---Prescription approved per [facility] Refill Protocol.       Macrina Locke RN BSN     [facility]        --Last visit:  5/25/2022     --Future Visit: 11/29/22 [place] for annual.

## 2022-11-20 ENCOUNTER — HEALTH MAINTENANCE LETTER (OUTPATIENT)
Age: 67
End: 2022-11-20

## 2022-11-29 ENCOUNTER — MYC MEDICAL ADVICE (OUTPATIENT)
Dept: FAMILY MEDICINE | Facility: CLINIC | Age: 67
End: 2022-11-29

## 2022-11-29 ENCOUNTER — OFFICE VISIT (OUTPATIENT)
Dept: FAMILY MEDICINE | Facility: CLINIC | Age: 67
End: 2022-11-29
Payer: MEDICARE

## 2022-11-29 VITALS
TEMPERATURE: 98.1 F | DIASTOLIC BLOOD PRESSURE: 70 MMHG | WEIGHT: 203.12 LBS | BODY MASS INDEX: 26.07 KG/M2 | OXYGEN SATURATION: 97 % | HEART RATE: 99 BPM | SYSTOLIC BLOOD PRESSURE: 142 MMHG | RESPIRATION RATE: 16 BRPM | HEIGHT: 74 IN

## 2022-11-29 DIAGNOSIS — Z12.11 SCREEN FOR COLON CANCER: ICD-10-CM

## 2022-11-29 DIAGNOSIS — Z00.00 ENCOUNTER FOR MEDICARE ANNUAL WELLNESS EXAM: Primary | ICD-10-CM

## 2022-11-29 DIAGNOSIS — Z79.4 TYPE 2 DIABETES MELLITUS WITHOUT COMPLICATION, WITH LONG-TERM CURRENT USE OF INSULIN (H): ICD-10-CM

## 2022-11-29 DIAGNOSIS — R41.89 COGNITIVE IMPAIRMENT: ICD-10-CM

## 2022-11-29 DIAGNOSIS — I10 ESSENTIAL HYPERTENSION: ICD-10-CM

## 2022-11-29 DIAGNOSIS — E11.9 TYPE 2 DIABETES MELLITUS WITHOUT COMPLICATION, WITH LONG-TERM CURRENT USE OF INSULIN (H): ICD-10-CM

## 2022-11-29 DIAGNOSIS — Z87.891 PERSONAL HISTORY OF TOBACCO USE: ICD-10-CM

## 2022-11-29 DIAGNOSIS — K21.9 GASTROESOPHAGEAL REFLUX DISEASE WITHOUT ESOPHAGITIS: ICD-10-CM

## 2022-11-29 LAB
ALBUMIN SERPL BCG-MCNC: 4.8 G/DL (ref 3.5–5.2)
ALP SERPL-CCNC: 190 U/L (ref 40–129)
ALT SERPL W P-5'-P-CCNC: 50 U/L (ref 10–50)
ANION GAP SERPL CALCULATED.3IONS-SCNC: 12 MMOL/L (ref 7–15)
AST SERPL W P-5'-P-CCNC: 37 U/L (ref 10–50)
BILIRUB SERPL-MCNC: 0.4 MG/DL
BUN SERPL-MCNC: 10.7 MG/DL (ref 8–23)
CALCIUM SERPL-MCNC: 9.8 MG/DL (ref 8.8–10.2)
CHLORIDE SERPL-SCNC: 103 MMOL/L (ref 98–107)
CHOLEST SERPL-MCNC: 102 MG/DL
CREAT SERPL-MCNC: 0.91 MG/DL (ref 0.67–1.17)
CREAT UR-MCNC: 141 MG/DL
DEPRECATED HCO3 PLAS-SCNC: 27 MMOL/L (ref 22–29)
ERYTHROCYTE [DISTWIDTH] IN BLOOD BY AUTOMATED COUNT: 12.6 % (ref 10–15)
GFR SERPL CREATININE-BSD FRML MDRD: >90 ML/MIN/1.73M2
GLUCOSE SERPL-MCNC: 135 MG/DL (ref 70–99)
HBA1C MFR BLD: 7.3 % (ref 0–5.6)
HCT VFR BLD AUTO: 45.3 % (ref 40–53)
HDLC SERPL-MCNC: 20 MG/DL
HGB BLD-MCNC: 14.8 G/DL (ref 13.3–17.7)
LDLC SERPL CALC-MCNC: 43 MG/DL
MCH RBC QN AUTO: 28.5 PG (ref 26.5–33)
MCHC RBC AUTO-ENTMCNC: 32.7 G/DL (ref 31.5–36.5)
MCV RBC AUTO: 87 FL (ref 78–100)
MICROALBUMIN UR-MCNC: 16.8 MG/L
MICROALBUMIN/CREAT UR: 11.91 MG/G CR (ref 0–17)
NONHDLC SERPL-MCNC: 82 MG/DL
PLATELET # BLD AUTO: 127 10E3/UL (ref 150–450)
POTASSIUM SERPL-SCNC: 4 MMOL/L (ref 3.4–5.3)
PROT SERPL-MCNC: 7.4 G/DL (ref 6.4–8.3)
RBC # BLD AUTO: 5.2 10E6/UL (ref 4.4–5.9)
SODIUM SERPL-SCNC: 142 MMOL/L (ref 136–145)
TRIGL SERPL-MCNC: 193 MG/DL
TSH SERPL DL<=0.005 MIU/L-ACNC: 3.45 UIU/ML (ref 0.3–4.2)
VIT B12 SERPL-MCNC: 532 PG/ML (ref 232–1245)
WBC # BLD AUTO: 5.3 10E3/UL (ref 4–11)

## 2022-11-29 PROCEDURE — 36415 COLL VENOUS BLD VENIPUNCTURE: CPT | Performed by: FAMILY MEDICINE

## 2022-11-29 PROCEDURE — 83036 HEMOGLOBIN GLYCOSYLATED A1C: CPT | Performed by: FAMILY MEDICINE

## 2022-11-29 PROCEDURE — 80053 COMPREHEN METABOLIC PANEL: CPT | Performed by: FAMILY MEDICINE

## 2022-11-29 PROCEDURE — 90471 IMMUNIZATION ADMIN: CPT | Performed by: FAMILY MEDICINE

## 2022-11-29 PROCEDURE — 85027 COMPLETE CBC AUTOMATED: CPT | Performed by: FAMILY MEDICINE

## 2022-11-29 PROCEDURE — 82607 VITAMIN B-12: CPT | Performed by: FAMILY MEDICINE

## 2022-11-29 PROCEDURE — 80061 LIPID PANEL: CPT | Performed by: FAMILY MEDICINE

## 2022-11-29 PROCEDURE — 82043 UR ALBUMIN QUANTITATIVE: CPT | Performed by: FAMILY MEDICINE

## 2022-11-29 PROCEDURE — G0296 VISIT TO DETERM LDCT ELIG: HCPCS | Performed by: FAMILY MEDICINE

## 2022-11-29 PROCEDURE — 99397 PER PM REEVAL EST PAT 65+ YR: CPT | Mod: 25 | Performed by: FAMILY MEDICINE

## 2022-11-29 PROCEDURE — 84443 ASSAY THYROID STIM HORMONE: CPT | Performed by: FAMILY MEDICINE

## 2022-11-29 PROCEDURE — 99214 OFFICE O/P EST MOD 30 MIN: CPT | Mod: 25 | Performed by: FAMILY MEDICINE

## 2022-11-29 PROCEDURE — 90677 PCV20 VACCINE IM: CPT | Performed by: FAMILY MEDICINE

## 2022-11-29 RX ORDER — LANCETS
EACH MISCELLANEOUS
Qty: 100 EACH | Refills: 3 | Status: SHIPPED | OUTPATIENT
Start: 2022-11-29 | End: 2024-04-22

## 2022-11-29 RX ORDER — INSULIN GLARGINE 100 [IU]/ML
INJECTION, SOLUTION SUBCUTANEOUS
Qty: 30 ML | Refills: 1 | Status: SHIPPED | OUTPATIENT
Start: 2022-11-29 | End: 2023-02-17

## 2022-11-29 RX ORDER — BLOOD SUGAR DIAGNOSTIC
STRIP MISCELLANEOUS
Qty: 200 STRIP | Refills: 3 | Status: SHIPPED | OUTPATIENT
Start: 2022-11-29 | End: 2024-04-22

## 2022-11-29 RX ORDER — ATORVASTATIN CALCIUM 40 MG/1
TABLET, FILM COATED ORAL
Qty: 90 TABLET | Refills: 1 | Status: SHIPPED | OUTPATIENT
Start: 2022-11-29 | End: 2023-09-27

## 2022-11-29 RX ORDER — AMLODIPINE BESYLATE 10 MG/1
10 TABLET ORAL EVERY MORNING
Qty: 90 TABLET | Refills: 1 | Status: SHIPPED | OUTPATIENT
Start: 2022-11-29 | End: 2023-09-27

## 2022-11-29 ASSESSMENT — ENCOUNTER SYMPTOMS
PALPITATIONS: 0
CHILLS: 0
DIARRHEA: 0
HEADACHES: 0
ARTHRALGIAS: 0
DIZZINESS: 0
SORE THROAT: 0
HEMATOCHEZIA: 0
HEARTBURN: 0
JOINT SWELLING: 0
WEAKNESS: 0
FREQUENCY: 1
FEVER: 0
CONSTIPATION: 0
NERVOUS/ANXIOUS: 0
NAUSEA: 0
ABDOMINAL PAIN: 0
HEMATURIA: 0
PARESTHESIAS: 0
MYALGIAS: 0
SHORTNESS OF BREATH: 0
EYE PAIN: 0
DYSURIA: 0
COUGH: 0

## 2022-11-29 ASSESSMENT — ACTIVITIES OF DAILY LIVING (ADL): CURRENT_FUNCTION: MEDICATION ADMINISTRATION REQUIRES ASSISTANCE

## 2022-11-29 NOTE — NURSING NOTE
Prior to immunization administration, verified patients identity using patient s name and date of birth. Please see Immunization Activity for additional information.     Screening Questionnaire for Adult Immunization    Are you sick today?   No   Do you have allergies to medications, food, a vaccine component or latex?   No   Have you ever had a serious reaction after receiving a vaccination?   No   Do you have a long-term health problem with heart, lung, kidney, or metabolic disease (e.g., diabetes), asthma, a blood disorder, no spleen, complement component deficiency, a cochlear implant, or a spinal fluid leak?  Are you on long-term aspirin therapy?   Yes   Do you have cancer, leukemia, HIV/AIDS, or any other immune system problem?   No   Do you have a parent, brother, or sister with an immune system problem?   No   In the past 3 months, have you taken medications that affect  your immune system, such as prednisone, other steroids, or anticancer drugs; drugs for the treatment of rheumatoid arthritis, Crohn s disease, or psoriasis; or have you had radiation treatments?   No   Have you had a seizure, or a brain or other nervous system problem?   Yes   During the past year, have you received a transfusion of blood or blood    products, or been given immune (gamma) globulin or antiviral drug?   No   For women: Are you pregnant or is there a chance you could become       pregnant during the next month?   No   Have you received any vaccinations in the past 4 weeks?   No     Immunization questionnaire was positive for at least one answer.  Notified ellen.        Per orders of Dr. hughes, injection of prevnar 20 given by Chayo Nogueira MA. Patient instructed to remain in clinic for 15 minutes afterwards, and to report any adverse reaction to me immediately.       Screening performed by Chayo Nogueira MA on 11/29/2022 at 9:53 AM.

## 2022-11-29 NOTE — PROGRESS NOTES
"SUBJECTIVE:   Karlo is a 67 year old who presents for Preventive Visit.  Patient has been advised of split billing requirements and indicates understanding: Yes  Are you in the first 12 months of your Medicare coverage?  Yes, visual acuity - PATIENT DECLINED     Healthy Habits:     In general, how would you rate your overall health?  Good    Frequency of exercise:  2-3 days/week    Duration of exercise:  45-60 minutes    Do you usually eat at least 4 servings of fruit and vegetables a day, include whole grains    & fiber and avoid regularly eating high fat or \"junk\" foods?  Yes    Taking medications regularly:  Yes    Medication side effects:  None    Ability to successfully perform activities of daily living:  Medication administration requires assistance    Home Safety:  No safety concerns identified    Hearing Impairment:  No hearing concerns    In the past 6 months, have you been bothered by leaking of urine?  No    In general, how would you rate your overall mental or emotional health?  Good      PHQ-2 Total Score: 0    Additional concerns today:  No    Have you ever done Advance Care Planning? (For example, a Health Directive, POLST, or a discussion with a medical provider or your loved ones about your wishes): No, advance care planning information given to patient to review.  Patient declined advance care planning discussion at this time.    Fall risk  Fallen 2 or more times in the past year?: No  Any fall with injury in the past year?: No    Cognitive Screening   1) Repeat 3 items (Leader, Season, Table)    2) Clock draw:   PATIENT DECLINED  3) 3 item recall:   Na - patient declined.  Results: patient declined    Mini-CogTM Copyright ALIS Gerber. Licensed by the author for use in [facility]; reprinted with permission ([email or web address]). All rights reserved.      Reviewed and updated as needed this visit by clinical staff    Allergies  Meds              Reviewed and updated as needed this visit by " Provider                 Social History     Tobacco Use     Smoking status: Every Day     Packs/day: 0.50     Years: 40.00     Pack years: 20.00     Types: Cigarettes     Smokeless tobacco: Never   Substance Use Topics     Alcohol use: Not Currently     Alcohol Use 11/29/2022   Prescreen: >3 drinks/day or >7 drinks/week? Not Applicable   Prescreen: >3 drinks/day or >7 drinks/week? -          Current providers sharing in care for this patient include:   Patient Care Team:  Reji Nguyen MD as PCP - General (Family Medicine)  Tigre Alfred MD as MD (Ophthalmology)  Tigre Alfred MD as Assigned Surgical Provider  Karlo Nice MD as MD (Pulmonary Disease)  Reji Nguyen MD as Assigned PCP  Karlo Nice MD as Assigned Pulmonology Provider  GUS Vargas MD as Assigned Heart and Vascular Provider    The following health maintenance items are reviewed in Epic and correct as of today:  Health Maintenance   Topic Date Due     Pneumococcal Vaccine: 65+ Years (2 - PCV) 07/22/2017     COLORECTAL CANCER SCREENING  12/07/2021     AORTIC ANEURYSM SCREENING (SYSTEM ASSIGNED)  Never done     A1C  08/25/2022     EYE EXAM  09/23/2022     BMP  11/22/2022     LIPID  11/22/2022     MICROALBUMIN  11/22/2022     ANNUAL REVIEW OF HM ORDERS  11/22/2022     MEDICARE ANNUAL WELLNESS VISIT  11/22/2022     LUNG CANCER SCREENING  02/08/2023     DIABETIC FOOT EXAM  05/25/2023     NICOTINE/TOBACCO CESSATION COUNSELING Q 1 YR  11/29/2023     FALL RISK ASSESSMENT  11/29/2023     ADVANCE CARE PLANNING  11/29/2027     DTAP/TDAP/TD IMMUNIZATION (4 - Td or Tdap) 11/20/2030     HEPATITIS C SCREENING  Completed     HIV SCREENING  Completed     PHQ-2 (once per calendar year)  Completed     INFLUENZA VACCINE  Completed     ZOSTER IMMUNIZATION  Completed     COVID-19 Vaccine  Completed     IPV IMMUNIZATION  Aged Out     MENINGITIS IMMUNIZATION  Aged Out     Average numbers over 100. Tops would  "be 140.     Fasting today.     Insulin no change - 22 units per day.     No new episode since march after procedure.     Smoking 10 cigs per day. No desire to quit it.     Some feet tingling which is chronic. Not changing.     Here with his group home staff.     Review of Systems   Constitutional: Negative for chills and fever.   HENT: Negative for congestion, ear pain, hearing loss and sore throat.    Eyes: Negative for pain and visual disturbance.   Respiratory: Negative for cough and shortness of breath.    Cardiovascular: Negative for chest pain, palpitations and peripheral edema.   Gastrointestinal: Negative for abdominal pain, constipation, diarrhea, heartburn, hematochezia and nausea.   Genitourinary: Positive for frequency. Negative for dysuria, genital sores, hematuria, impotence, penile discharge and urgency.   Musculoskeletal: Negative for arthralgias, joint swelling and myalgias.   Skin: Negative for rash.   Neurological: Negative for dizziness, weakness, headaches and paresthesias.   Psychiatric/Behavioral: Negative for mood changes. The patient is not nervous/anxious.      OBJECTIVE:   BP (!) 142/70 (BP Location: Left arm, Patient Position: Sitting, Cuff Size: Adult Large)   Pulse 99   Temp 98.1  F (36.7  C) (Temporal)   Resp 16   Ht 1.88 m (6' 2\")   Wt 92.1 kg (203 lb 2 oz)   SpO2 97%   BMI 26.08 kg/m   Estimated body mass index is 26.08 kg/m  as calculated from the following:    Height as of this encounter: 1.88 m (6' 2\").    Weight as of this encounter: 92.1 kg (203 lb 2 oz).  Physical Exam  GENERAL: healthy, alert and no distress  EYES: Eyes grossly normal to inspection, PERRL and conjunctivae and sclerae normal  HENT: ear canals and TM's normal, nose and mouth without ulcers or lesions  NECK: no adenopathy, no asymmetry, masses, or scars and thyroid normal to palpation  RESP: lungs clear to auscultation - no rales, rhonchi or wheezes  CV: regular rate and rhythm, normal S1 S2, no S3 or S4, " no murmur, click or rub, no peripheral edema and peripheral pulses strong  ABDOMEN: soft, nontender, no hepatosplenomegaly, no masses and bowel sounds normal  MS: no gross musculoskeletal defects noted, no edema  SKIN: no suspicious lesions or rashes  NEURO: Normal strength and tone, speech is normal. Somewhat confused with few questions.   PSYCH: affects somewhat guarded    ASSESSMENT / PLAN:   (Z00.00) Encounter for Medicare annual wellness exam  (primary encounter diagnosis)  Comment: declined vision screen, memory questions today. Explained it to staff and patient about medicare guidelines.   Plan:      (E11.9,  Z79.4) Type 2 diabetes mellitus without complication, with long-term current use of insulin (H)  Comment: mild worsening of diabetes. Continue to monitor and stick to same rx for now.   Plan: PRIMARY CARE FOLLOW-UP SCHEDULING, Lipid panel         reflex to direct LDL Fasting, Comprehensive         metabolic panel, Albumin Random Urine         Quantitative with Creat Ratio, Hemoglobin A1c,         CBC with platelets, Vitamin B12, TSH with free         T4 reflex, insulin glargine (LANTUS SOLOSTAR)         100 UNIT/ML pen, atorvastatin (LIPITOR) 40 MG         tablet, blood glucose (ACCU-CHEK VANIA PLUS)         test strip, blood glucose (NO BRAND SPECIFIED)         test strip, thin (NO BRAND SPECIFIED) lancets             (I10) Essential hypertension  Comment:  bp above goal. Facility will provide bp readings from facility. If persistently high, need to add another agent. They understood.   Plan: amLODIPine (NORVASC) 10 MG tablet             (Z87.891) Personal history of tobacco use  Comment:  Ongoing. No interest in quitting. Needs repeat ct scan. Had bronch. Does not need further bronch but yearly ct scan.   Plan: Prof fee: Shared Decision Making for Lung         Cancer Screening, CT Chest Lung Cancer Scrn Low        Dose wo             (W70.80) Cognitive impairment  Comment:    Plan:  With history of  "traumatic brain injury and seizure disorder. Seeing neuro.     (K21.9) Gastroesophageal reflux disease without esophagitis  Comment:  Long term chronic ppi use. Did not go in details of side effects today.   Plan: omeprazole (PRILOSEC) 20 MG DR capsule             (Z12.11) Screen for colon cancer  Comment:    Plan: COLOGUARD([employer])                     COUNSELING:  Reviewed preventive health counseling, as reflected in patient instructions    BMI:   Estimated body mass index is 26.08 kg/m  as calculated from the following:    Height as of this encounter: 1.88 m (6' 2\").    Weight as of this encounter: 92.1 kg (203 lb 2 oz).         He reports that he has been smoking cigarettes. He has a 20.00 pack-year smoking history. He has never used smokeless tobacco.  Nicotine/Tobacco Cessation Plan:   Information offered: Patient not interested at this time      Appropriate preventive services were discussed with this patient, including applicable screening as appropriate for cardiovascular disease, diabetes, osteopenia/osteoporosis, and glaucoma.  As appropriate for age/gender, discussed screening for colorectal cancer, prostate cancer, breast cancer, and cervical cancer. Checklist reviewing preventive services available has been given to the patient.    Reviewed patients plan of care and provided an AVS. The Basic Care Plan (routine screening as documented in Health Maintenance) for Karlo meets the Care Plan requirement. This Care Plan has been established and reviewed with the Patient and caregiver.       Reji Nguyen MD, MD  [facility]    Identified Health Risks:    The patient reports that he has difficulty with activities of daily living. I have asked that the patient make a follow up appointment in  26 weeks where this issue will be further evaluated and addressed.  Lung Cancer Screening Shared Decision Making Visit     Karlo Townsend, a 67 year old male, is " eligible for lung cancer screening    History   Smoking Status     Every Day     Packs/day: 0.50     Years: 40.00     Types: Cigarettes   Smokeless Tobacco     Never        I have discussed with patient the risks and benefits of screening for lung cancer with low-dose CT.     The risks include:    radiation exposure: one low dose chest CT has as much ionizing radiation as about 15 chest x-rays, or 6 months of background radiation living in [place]      false positives: most findings/nodules are NOT cancer, but some might still require additional diagnostic evaluation, including biopsy    over-diagnosis: some slow growing cancers that might never have been clinically significant will be detected and treated unnecessarily     The benefit of early detection of lung cancer is contingent upon adherence to annual screening or more frequent follow up if indicated.     Furthermore, to benefit from screening, Karlo must be willing and able to undergo diagnostic procedures, if indicated. Although no specific guide is available for determining severity of comorbidities, it is reasonable to withhold screening in patients who have greater mortality risk from other diseases.     We did discuss that the best way to prevent lung cancer is to not smoke.    Some patients may value a numeric estimation of lung cancer risk when evaluating if lung cancer screening is right for them, here is one calculator:    ShouldIScreen

## 2022-11-29 NOTE — PATIENT INSTRUCTIONS
Blood pressure - check it and report blood pressure readings to us.   Complete stool test  Work on quitting smoking  Complete CAT scan for lungs - [phone]  Follow up in 6 months.   Consider eye exam for diabetes which is recommended once per year.       Patient Education   Personalized Prevention Plan  You are due for the preventive services outlined below.  Your care team is available to assist you in scheduling these services.  If you have already completed any of these items, please share that information with your care team to update in your medical record.  Health Maintenance Due   Topic Date Due    Pneumococcal Vaccine (2 - PCV) 07/22/2017    Colorectal Cancer Screening  12/07/2021    AORTIC ANEURYSM SCREENING (SYSTEM ASSIGNED)  Never done    A1C Lab  08/25/2022    Eye Exam  09/23/2022    Basic Metabolic Panel  11/22/2022    Cholesterol Lab  11/22/2022    Kidney Microalbumin Urine Test  11/22/2022    ANNUAL REVIEW OF HM ORDERS  11/22/2022    Talk to your care team about options to quit tobacco use.  11/22/2022    Annual Wellness Visit  11/22/2022        Patient Education   Personalized Prevention Plan  You are due for the preventive services outlined below.  Your care team is available to assist you in scheduling these services.  If you have already completed any of these items, please share that information with your care team to update in your medical record.  Health Maintenance Due   Topic Date Due    Pneumococcal Vaccine (2 - PCV) 07/22/2017    Colorectal Cancer Screening  12/07/2021    AORTIC ANEURYSM SCREENING (SYSTEM ASSIGNED)  Never done    A1C Lab  08/25/2022    Eye Exam  09/23/2022    Basic Metabolic Panel  11/22/2022    Cholesterol Lab  11/22/2022    Kidney Microalbumin Urine Test  11/22/2022    ANNUAL REVIEW OF HM ORDERS  11/22/2022    Annual Wellness Visit  11/22/2022     Activities of Daily Living    Your Health Risk Assessment indicates you have difficulties with activities of daily  living such as housework, bathing, preparing meals, taking medication, etc. Please make a follow up appointment for us to address this issue in more detail.     Lung Cancer Screening   Frequently Asked Questions  If you are at high-risk for lung cancer, getting screened with low-dose computed tomography (LDCT) every year can help save your life. This handout offers answers to some of the most common questions about lung cancer screening. If you have other questions, please call [phone] ([phone]).     What is it?  Lung cancer screening uses special X-ray technology to create an image of your lung tissue. The exam is quick and easy and takes less than 10 seconds. We don t give you any medicine or use any needles. You can eat before and after the exam. You don t need to change your clothes as long as the clothing on your chest doesn t contain metal. But, you do need to be able to hold your breath for at least 6 seconds during the exam.    What is the goal of lung cancer screening?  The goal of lung cancer screening is to save lives. Many times, lung cancer is not found until a person starts having physical symptoms. Lung cancer screening can help detect lung cancer in the earliest stages when it may be easier to treat.    Who should be screened for lung cancer?  We suggest lung cancer screening for anyone who is at high-risk for lung cancer. You are in the high-risk group if you:     are between the ages of 55 and 79, and   have smoked at least 1 pack of cigarettes a day for 20 or more years, and   still smoke or have quit within the past 15 years.    However, if you have a new cough or shortness of breath, you should talk to your doctor before being screened.    Why does it matter if I have symptoms?  Certain symptoms can be a sign that you have a condition in your lungs that should be checked and treated by your doctor. These symptoms include fever, chest pain, a new or changing cough, shortness of  breath that you have never felt before, coughing up blood or unexplained weight loss. Having any of these symptoms can greatly affect the results of lung cancer screening.       Should all smokers get an LDCT lung cancer screening exam?  It depends. Lung cancer screening is for a very specific group of men and women who have a history of heavy smoking over a long period of time (see  Who should be screened for lung cancer  above).  I am in the high-risk group, but have been diagnosed with cancer in the past. Is LDCT lung cancer screening right for me?  In some cases, you should not have LDCT lung screening, such as when your doctor is already following your cancer with CT scan studies. Your doctor will help you decide if LDCT lung screening is right for you.  Do I need to have a screening exam every year?  Yes. If you are in the high-risk group described earlier, you should get an LDCT lung cancer screening exam every year until you are 79, or are no longer willing or able to undergo screening and possible procedures to diagnose and treat lung cancer.  How effective is LDCT at preventing death from lung cancer?  Studies have shown that LDCT lung cancer screening can lower the risk of death from lung cancer by 20 percent in people who are at high-risk.  What are the risks?  There are some risks and limitations of LDCT lung cancer screening. We want to make sure you understand the risks and benefits, so please let us know if you have any questions. Your doctor may want to talk with you more about these risks.   Radiation exposure: As with any exam that uses radiation, there is a very small increased risk of cancer. The amount of radiation in LDCT is small--about the same amount a person would get from a mammogram. Your doctor orders the exam when he or she feels the potential benefits outweigh the risks.   False negatives: No test is perfect, including LDCT. It is possible that you may have a medical condition,  including lung cancer, that is not found during your exam. This is called a false negative result.   False positives and more testing: LDCT very often finds something in the lung that could be cancer, but in fact is not. This is called a false positive result. False positive tests often cause anxiety. To make sure these findings are not cancer, you may need to have more tests. These tests will be done only if you give us permission. Sometimes patients need a treatment that can have side effects, such as a biopsy. For more information on false positives, see  What can I expect from the results?    Findings not related to lung cancer: Your LDCT exam also takes pictures of areas of your body next to your lungs. In a very small number of cases, the CT scan will show an abnormal finding in one of these areas, such as your kidneys, adrenal glands, liver or thyroid. This finding may not be serious, but you may need more tests. Your doctor can help you decide what other tests you may need, if any.  What can I expect from the results?  About 1 out of 4 LDCT exams will find something that may need more tests. Most of the time, these findings are lung nodules. Lung nodules are very small collections of tissue in the lung. These nodules are very common, and the vast majority--more than 97 percent--are not cancer (benign). Most are normal lymph nodes or small areas of scarring from past infections.  But, if a small lung nodule is found to be cancer, the cancer can be cured more than 90 percent of the time. To know if the nodule is cancer, we may need to get more images before your next yearly screening exam. If the nodule has suspicious features (for example, it is large, has an odd shape or grows over time), we will refer you to a specialist for further testing.  Will my doctor also get the results?  Yes. Your doctor will get a copy of your results.  Is it okay to keep smoking now that there s a cancer screening exam?  No.  Tobacco is one of the strongest cancer-causing agents. It causes not only lung cancer, but other cancers and cardiovascular (heart) diseases as well. The damage caused by smoking builds over time. This means that the longer you smoke, the higher your risk of disease. While it is never too late to quit, the sooner you quit, the better.  Where can I find help to quit smoking?  The best way to prevent lung cancer is to stop smoking. If you have already quit smoking, congratulations and keep it up! For help on quitting smoking, please call [employer] at 4-613-QUITNOW (1-420.942.4892) or the American [employer] at 1-552.284.7946 to find local resources near you.  One-on-one health coaching:  If you d prefer to work individually with a health care provider on tobacco cessation, we offer:     Medication Therapy Management:  Our specially trained pharmacists work closely with you and your doctor to help you quit smoking.  Call [phone] or [phone] (toll free).

## 2022-11-30 NOTE — TELEPHONE ENCOUNTER
Patient was seen in cliic yesterday.  Have you seen the form for Diabetic Testing supplies?  Abbey Goldberg RN  [facility]

## 2022-12-14 ENCOUNTER — APPOINTMENT (OUTPATIENT)
Dept: GENERAL RADIOLOGY | Facility: CLINIC | Age: 67
End: 2022-12-14
Attending: EMERGENCY MEDICINE
Payer: MEDICARE

## 2022-12-14 ENCOUNTER — HOSPITAL ENCOUNTER (OUTPATIENT)
Facility: CLINIC | Age: 67
Setting detail: OBSERVATION
Discharge: NURSING, REHAB OR LONG-TERM CARE | End: 2022-12-15
Attending: EMERGENCY MEDICINE | Admitting: PHYSICIAN ASSISTANT
Payer: MEDICARE

## 2022-12-14 DIAGNOSIS — E78.5 HYPERLIPIDEMIA, UNSPECIFIED HYPERLIPIDEMIA TYPE: ICD-10-CM

## 2022-12-14 DIAGNOSIS — F17.210 CIGARETTE SMOKER: ICD-10-CM

## 2022-12-14 DIAGNOSIS — R05.9 COUGH, UNSPECIFIED TYPE: ICD-10-CM

## 2022-12-14 DIAGNOSIS — R51.9 NONINTRACTABLE HEADACHE, UNSPECIFIED CHRONICITY PATTERN, UNSPECIFIED HEADACHE TYPE: ICD-10-CM

## 2022-12-14 DIAGNOSIS — R05.1 ACUTE COUGH: ICD-10-CM

## 2022-12-14 DIAGNOSIS — I10 ESSENTIAL HYPERTENSION, BENIGN: ICD-10-CM

## 2022-12-14 DIAGNOSIS — E11.9 TYPE 2 DIABETES MELLITUS WITHOUT COMPLICATION, WITH LONG-TERM CURRENT USE OF INSULIN (H): ICD-10-CM

## 2022-12-14 DIAGNOSIS — R06.02 SHORTNESS OF BREATH: ICD-10-CM

## 2022-12-14 DIAGNOSIS — Z20.822 CONTACT WITH AND (SUSPECTED) EXPOSURE TO COVID-19: ICD-10-CM

## 2022-12-14 DIAGNOSIS — M54.50 LOW BACK PAIN, UNSPECIFIED BACK PAIN LATERALITY, UNSPECIFIED CHRONICITY, UNSPECIFIED WHETHER SCIATICA PRESENT: ICD-10-CM

## 2022-12-14 DIAGNOSIS — Z87.820 HISTORY OF TRAUMATIC BRAIN INJURY: ICD-10-CM

## 2022-12-14 DIAGNOSIS — R07.89 ATYPICAL CHEST PAIN: ICD-10-CM

## 2022-12-14 DIAGNOSIS — Z79.4 TYPE 2 DIABETES MELLITUS WITHOUT COMPLICATION, WITH LONG-TERM CURRENT USE OF INSULIN (H): ICD-10-CM

## 2022-12-14 LAB
ANION GAP SERPL CALCULATED.3IONS-SCNC: 11 MMOL/L (ref 7–15)
ATRIAL RATE - MUSE: 69 BPM
BASOPHILS # BLD AUTO: 0 10E3/UL (ref 0–0.2)
BASOPHILS NFR BLD AUTO: 0 %
BUN SERPL-MCNC: 11.5 MG/DL (ref 8–23)
CALCIUM SERPL-MCNC: 8.7 MG/DL (ref 8.8–10.2)
CHLORIDE SERPL-SCNC: 104 MMOL/L (ref 98–107)
CREAT SERPL-MCNC: 1.05 MG/DL (ref 0.67–1.17)
DEPRECATED HCO3 PLAS-SCNC: 26 MMOL/L (ref 22–29)
DIASTOLIC BLOOD PRESSURE - MUSE: NORMAL MMHG
EOSINOPHIL # BLD AUTO: 0 10E3/UL (ref 0–0.7)
EOSINOPHIL NFR BLD AUTO: 0 %
ERYTHROCYTE [DISTWIDTH] IN BLOOD BY AUTOMATED COUNT: 13 % (ref 10–15)
FLUAV RNA SPEC QL NAA+PROBE: NEGATIVE
FLUBV RNA RESP QL NAA+PROBE: NEGATIVE
GFR SERPL CREATININE-BSD FRML MDRD: 79 ML/MIN/1.73M2
GLUCOSE BLDC GLUCOMTR-MCNC: 96 MG/DL (ref 70–99)
GLUCOSE SERPL-MCNC: 124 MG/DL (ref 70–99)
HCT VFR BLD AUTO: 40.1 % (ref 40–53)
HGB BLD-MCNC: 13.1 G/DL (ref 13.3–17.7)
IMM GRANULOCYTES # BLD: 0 10E3/UL
IMM GRANULOCYTES NFR BLD: 1 %
INTERPRETATION ECG - MUSE: NORMAL
LYMPHOCYTES # BLD AUTO: 0.9 10E3/UL (ref 0.8–5.3)
LYMPHOCYTES NFR BLD AUTO: 21 %
MCH RBC QN AUTO: 28.7 PG (ref 26.5–33)
MCHC RBC AUTO-ENTMCNC: 32.7 G/DL (ref 31.5–36.5)
MCV RBC AUTO: 88 FL (ref 78–100)
MONOCYTES # BLD AUTO: 0.5 10E3/UL (ref 0–1.3)
MONOCYTES NFR BLD AUTO: 11 %
NEUTROPHILS # BLD AUTO: 2.8 10E3/UL (ref 1.6–8.3)
NEUTROPHILS NFR BLD AUTO: 67 %
NRBC # BLD AUTO: 0 10E3/UL
NRBC BLD AUTO-RTO: 0 /100
NT-PROBNP SERPL-MCNC: 158 PG/ML (ref 0–900)
P AXIS - MUSE: 82 DEGREES
PLATELET # BLD AUTO: 148 10E3/UL (ref 150–450)
POTASSIUM SERPL-SCNC: 3.8 MMOL/L (ref 3.4–5.3)
PR INTERVAL - MUSE: 140 MS
QRS DURATION - MUSE: 98 MS
QT - MUSE: 402 MS
QTC - MUSE: 430 MS
R AXIS - MUSE: 54 DEGREES
RBC # BLD AUTO: 4.56 10E6/UL (ref 4.4–5.9)
RSV RNA SPEC NAA+PROBE: NEGATIVE
SARS-COV-2 RNA RESP QL NAA+PROBE: NEGATIVE
SODIUM SERPL-SCNC: 141 MMOL/L (ref 136–145)
SYSTOLIC BLOOD PRESSURE - MUSE: NORMAL MMHG
T AXIS - MUSE: 65 DEGREES
TROPONIN T SERPL HS-MCNC: 12 NG/L
TROPONIN T SERPL HS-MCNC: 8 NG/L
VENTRICULAR RATE- MUSE: 69 BPM
WBC # BLD AUTO: 4.1 10E3/UL (ref 4–11)

## 2022-12-14 PROCEDURE — 83880 ASSAY OF NATRIURETIC PEPTIDE: CPT | Performed by: EMERGENCY MEDICINE

## 2022-12-14 PROCEDURE — 84484 ASSAY OF TROPONIN QUANT: CPT | Performed by: EMERGENCY MEDICINE

## 2022-12-14 PROCEDURE — 93005 ELECTROCARDIOGRAM TRACING: CPT | Performed by: EMERGENCY MEDICINE

## 2022-12-14 PROCEDURE — 36415 COLL VENOUS BLD VENIPUNCTURE: CPT | Performed by: PHYSICIAN ASSISTANT

## 2022-12-14 PROCEDURE — 99218 PR INITIAL OBSERVATION CARE,LEVEL I: CPT | Performed by: NURSE PRACTITIONER

## 2022-12-14 PROCEDURE — 87070 CULTURE OTHR SPECIMN AEROBIC: CPT | Performed by: NURSE PRACTITIONER

## 2022-12-14 PROCEDURE — 87637 SARSCOV2&INF A&B&RSV AMP PRB: CPT | Performed by: EMERGENCY MEDICINE

## 2022-12-14 PROCEDURE — 36415 COLL VENOUS BLD VENIPUNCTURE: CPT | Performed by: EMERGENCY MEDICINE

## 2022-12-14 PROCEDURE — G0378 HOSPITAL OBSERVATION PER HR: HCPCS | Mod: CS

## 2022-12-14 PROCEDURE — C9803 HOPD COVID-19 SPEC COLLECT: HCPCS | Performed by: EMERGENCY MEDICINE

## 2022-12-14 PROCEDURE — 250N000013 HC RX MED GY IP 250 OP 250 PS 637: Performed by: NURSE PRACTITIONER

## 2022-12-14 PROCEDURE — 71046 X-RAY EXAM CHEST 2 VIEWS: CPT | Mod: 26 | Performed by: RADIOLOGY

## 2022-12-14 PROCEDURE — 84484 ASSAY OF TROPONIN QUANT: CPT | Performed by: PHYSICIAN ASSISTANT

## 2022-12-14 PROCEDURE — 250N000012 HC RX MED GY IP 250 OP 636 PS 637: Performed by: NURSE PRACTITIONER

## 2022-12-14 PROCEDURE — 99285 EMERGENCY DEPT VISIT HI MDM: CPT | Mod: 25 | Performed by: EMERGENCY MEDICINE

## 2022-12-14 PROCEDURE — 71046 X-RAY EXAM CHEST 2 VIEWS: CPT

## 2022-12-14 PROCEDURE — 96372 THER/PROPH/DIAG INJ SC/IM: CPT | Performed by: NURSE PRACTITIONER

## 2022-12-14 PROCEDURE — 82962 GLUCOSE BLOOD TEST: CPT

## 2022-12-14 PROCEDURE — 85025 COMPLETE CBC W/AUTO DIFF WBC: CPT | Performed by: EMERGENCY MEDICINE

## 2022-12-14 PROCEDURE — 93010 ELECTROCARDIOGRAM REPORT: CPT | Performed by: EMERGENCY MEDICINE

## 2022-12-14 PROCEDURE — 80048 BASIC METABOLIC PNL TOTAL CA: CPT | Performed by: EMERGENCY MEDICINE

## 2022-12-14 RX ORDER — ATORVASTATIN CALCIUM 40 MG/1
40 TABLET, FILM COATED ORAL EVERY EVENING
Status: DISCONTINUED | OUTPATIENT
Start: 2022-12-14 | End: 2022-12-15 | Stop reason: HOSPADM

## 2022-12-14 RX ORDER — IBUPROFEN 200 MG
200 TABLET ORAL EVERY 4 HOURS PRN
Status: DISCONTINUED | OUTPATIENT
Start: 2022-12-14 | End: 2022-12-15 | Stop reason: HOSPADM

## 2022-12-14 RX ORDER — LAMOTRIGINE 300 MG/1
300 TABLET, EXTENDED RELEASE ORAL AT BEDTIME
Status: DISCONTINUED | OUTPATIENT
Start: 2022-12-14 | End: 2022-12-14

## 2022-12-14 RX ORDER — NITROGLYCERIN 0.4 MG/1
0.4 TABLET SUBLINGUAL EVERY 5 MIN PRN
Status: DISCONTINUED | OUTPATIENT
Start: 2022-12-14 | End: 2022-12-15 | Stop reason: HOSPADM

## 2022-12-14 RX ORDER — LAMOTRIGINE 150 MG/1
300 TABLET ORAL 2 TIMES DAILY
Status: DISCONTINUED | OUTPATIENT
Start: 2022-12-14 | End: 2022-12-15 | Stop reason: HOSPADM

## 2022-12-14 RX ORDER — ACETAMINOPHEN 325 MG/1
325-650 TABLET ORAL EVERY 6 HOURS PRN
Status: DISCONTINUED | OUTPATIENT
Start: 2022-12-14 | End: 2022-12-15 | Stop reason: HOSPADM

## 2022-12-14 RX ORDER — AMLODIPINE BESYLATE 10 MG/1
10 TABLET ORAL EVERY MORNING
Status: DISCONTINUED | OUTPATIENT
Start: 2022-12-15 | End: 2022-12-15 | Stop reason: HOSPADM

## 2022-12-14 RX ORDER — PANTOPRAZOLE SODIUM 40 MG/1
40 TABLET, DELAYED RELEASE ORAL
Status: DISCONTINUED | OUTPATIENT
Start: 2022-12-15 | End: 2022-12-15 | Stop reason: HOSPADM

## 2022-12-14 RX ORDER — LEVETIRACETAM 500 MG/1
1000 TABLET ORAL 2 TIMES DAILY
Status: DISCONTINUED | OUTPATIENT
Start: 2022-12-14 | End: 2022-12-15 | Stop reason: HOSPADM

## 2022-12-14 RX ORDER — DEXTROSE MONOHYDRATE 25 G/50ML
25-50 INJECTION, SOLUTION INTRAVENOUS
Status: DISCONTINUED | OUTPATIENT
Start: 2022-12-14 | End: 2022-12-15 | Stop reason: HOSPADM

## 2022-12-14 RX ORDER — ALBUTEROL SULFATE 0.83 MG/ML
2.5 SOLUTION RESPIRATORY (INHALATION)
Status: DISCONTINUED | OUTPATIENT
Start: 2022-12-14 | End: 2022-12-15 | Stop reason: HOSPADM

## 2022-12-14 RX ORDER — MAGNESIUM HYDROXIDE/ALUMINUM HYDROXICE/SIMETHICONE 120; 1200; 1200 MG/30ML; MG/30ML; MG/30ML
30 SUSPENSION ORAL EVERY 4 HOURS PRN
Status: DISCONTINUED | OUTPATIENT
Start: 2022-12-14 | End: 2022-12-15 | Stop reason: HOSPADM

## 2022-12-14 RX ORDER — NICOTINE POLACRILEX 4 MG
15-30 LOZENGE BUCCAL
Status: DISCONTINUED | OUTPATIENT
Start: 2022-12-14 | End: 2022-12-15 | Stop reason: HOSPADM

## 2022-12-14 RX ADMIN — LAMOTRIGINE 300 MG: 150 TABLET ORAL at 23:02

## 2022-12-14 RX ADMIN — INSULIN GLARGINE 11 UNITS: 100 INJECTION, SOLUTION SUBCUTANEOUS at 23:04

## 2022-12-14 RX ADMIN — LEVETIRACETAM 1000 MG: 500 TABLET, FILM COATED ORAL at 23:02

## 2022-12-14 RX ADMIN — ATORVASTATIN CALCIUM 40 MG: 40 TABLET, FILM COATED ORAL at 23:02

## 2022-12-14 ASSESSMENT — ACTIVITIES OF DAILY LIVING (ADL)
ADLS_ACUITY_SCORE: 35

## 2022-12-14 NOTE — ED PROVIDER NOTES
[place] EMERGENCY DEPARTMENT ([facility])  12/14/22 Vertical Triage B   History     Chief Complaint   Patient presents with     Shortness of Breath     HPI  Karlo Townsend is a 67 year old male with a history notable for DM II, HTN, hyperlipidemia, seizure disorder and tobacco use who presents to the ED for evaluation of chest pain, cough, shortness of breath, headache and low back pain. He states he has had chest pain for one week, comes and goes and is worse with activity and with coughing. He had negative COVID-19 testing last week. He notes that at home his O2 sats dropped to 88% yesterday and were 89% this morning.  He notes prior history of pneumonia, no heart or lung problems though he is a smoker.  Patient lives in a group home and there was another resident at the group home that was recently positive for influenza.  Patient states that he has had exertional chest pain for a while and has not had a previous cardiac work-up.  Denies any history of known heart disease or lung disease.  He denies fever, sore throat, runny nose.  No abdominal pain.  He is here with his .      Per [facility] records patient has had 5 doses of Moderna COVID-19 vaccination as well as this year's flu shot. He notes possible flu exposure. He endorses loss of appetite, no nausea or vomiting. Patient has a stress echo ordered but not yet performed.         I have reviewed the Medications, Allergies, Past Medical and Surgical History, and Social History in the [facility] system.  PAST MEDICAL HISTORY:   Past Medical History:   Diagnosis Date     Current tobacco use      Diabetes mellitus (H)      Esophageal reflux      HTN (hypertension)      Seizure disorder (H)      TBI (traumatic brain injury)        PAST SURGICAL HISTORY:   Past Surgical History:   Procedure Laterality Date     APPENDECTOMY       BRONCHOSCOPY FLEXIBLE AND RIGID N/A 3/25/2022    Procedure: flexible bronchoscopy, therapeutic suctioning;   Surgeon: Chela Magana MD;  Location: [facility] OR     MANDIBLE SURGERY         Past medical history, past surgical history, medications, and allergies were reviewed with the patient. Additional pertinent items: None    FAMILY HISTORY:   Family History   Problem Relation Age of Onset     Diabetes No family hx of      Glaucoma No family hx of      Macular Degeneration No family hx of      Anesthesia Reaction No family hx of      Deep Vein Thrombosis (DVT) No family hx of        SOCIAL HISTORY:   Social History     Tobacco Use     Smoking status: Every Day     Packs/day: 0.50     Years: 40.00     Pack years: 20.00     Types: Cigarettes     Smokeless tobacco: Never   Substance Use Topics     Alcohol use: Not Currently     Social history was reviewed with the patient. Additional pertinent items: None      Patient's Medications   New Prescriptions    No medications on file   Previous Medications    ACETAMINOPHEN (TYLENOL) 325 MG TABLET    Take 325-650 mg by mouth every 6 hours as needed for mild pain    ALCOHOL SWAB PREP PADS    Use to swab area of injection/gatito as directed    AMLODIPINE (NORVASC) 10 MG TABLET    Take 1 tablet (10 mg) by mouth every morning    ATORVASTATIN (LIPITOR) 40 MG TABLET    TAKE 1 TABLET [40MG]  BY MOUTH ONCE DAILY Strength: 40 mg    BLOOD GLUCOSE (ACCU-CHEK VANIA PLUS) TEST STRIP    TEST TWICE DAILY    BLOOD GLUCOSE (NO BRAND SPECIFIED) TEST STRIP    Use to test blood sugar 1 times daily or as directed.    BLOOD GLUCOSE CALIBRATION (NO BRAND SPECIFIED) SOLUTION    To accompany: Blood Glucose Monitor Brands: per insurance.    BLOOD GLUCOSE MONITORING (NO BRAND SPECIFIED) METER DEVICE KIT    Use to test blood sugar 1 times daily or as directed.    DIPHENHYDRAMINE HCL (BENADRYL ALLERGY PO)        IBUPROFEN (ADVIL/MOTRIN) 200 MG TABLET    Take 200 mg by mouth every 4 hours as needed for mild pain    INSULIN GLARGINE (LANTUS SOLOSTAR) 100 UNIT/ML PEN    INJECT 22 UNITS SUBCUTANEOUSLY ONCE DAILY  "*DISCARD PEN 28 DAYS AFTER FIRST USE, DO NOT REFRIGERATE AFTER FIRST USE* (5 PENS MUST LAST 62 DAYS)    INSULIN PEN NEEDLE (B-D U/F) 31G X 5 MM MISCELLANEOUS    by Device route daily Use 1 pen needles daily or as directed.    LAMOTRIGINE (LAMICTAL XR) 300 MG 24 HR TABLET    Take 300 mg by mouth every morning    LEVETIRACETAM (KEPPRA) 1000 MG TABLET    Take 1,000 mg by mouth 2 times daily     LOPERAMIDE (IMODIUM) 2 MG CAPSULE    Take 2 mg by mouth 4 times daily as needed for diarrhea    OMEPRAZOLE (PRILOSEC) 20 MG DR CAPSULE    Take 1 capsule (20 mg) by mouth every morning    SODIUM CHLORIDE (OCEAN) 0.65 % NASAL SPRAY    Spray 1 spray into both nostrils daily as needed for congestion    THIN (NO BRAND SPECIFIED) LANCETS    Use to test blood sugar 2 times daily or as directed. To accompany: Blood Glucose Monitor Brands: per insurance.   Modified Medications    No medications on file   Discontinued Medications    No medications on file          Allergies   Allergen Reactions     Valproic Acid Other (See Comments)     Confusion/Sedation     Penicillins         Review of Systems  A complete review of systems was performed with pertinent positives and negatives noted in the HPI, and all other systems negative.    Physical Exam   BP: 125/67  Pulse: 81  Temp: 98.7  F (37.1  C)  Resp: 17  Height: 188 cm (6' 2\")  Weight: 95.3 kg (210 lb)  SpO2: 96 %      Physical Exam    GEN:  Alert, well developed, no acute distress  HEENT:  PERRL, EOMI, Mucous membranes are moist.   Cardio:  RRR, no murmur, radial pulses equal bilaterally  PULM:  Lungs have good air movement, no wheezes or rales, but there are numerous rhonchi heard bilaterally throughout both lung fields  Abd:  Soft, normal bowel sounds, no focal tenderness  Back exam:  No CVA tenderness  Musculoskeletal:  normal range of motion, no lower extremity swelling or calf tenderness  Neuro:  Alert and oriented X3, Follows commands, moving all extremities spontaneously   Skin:  " Warm, dry   ED Course        Procedures            EKG Interpretation:      Interpreted by Zahida Miles MD  Time reviewed: 14:35  Symptoms at time of EKG: chest pain   Rhythm: normal sinus with sinus arrhythmia  Rate: normal  Axis: normal  Ectopy: none  Conduction: normal  ST Segments/ T Waves: No ST-T wave changes  Q Waves: none  Comparison to prior: No old EKG available    Clinical Impression: normal EKG    Labs were reviewed by me and results are shown below.  Chest x-ray was reviewed by me and results are shown below.          Results for orders placed or performed during the hospital encounter of 12/14/22 (from the past 24 hour(s))   CBC with platelets differential    Narrative    The following orders were created for panel order CBC with platelets differential.  Procedure                               Abnormality         Status                     ---------                               -----------         ------                     CBC with platelets and d...[[id number]]  Abnormal            Final result                 Please view results for these tests on the individual orders.   Basic metabolic panel   Result Value Ref Range    Sodium 141 136 - 145 mmol/L    Potassium 3.8 3.4 - 5.3 mmol/L    Chloride 104 98 - 107 mmol/L    Carbon Dioxide (CO2) 26 22 - 29 mmol/L    Anion Gap 11 7 - 15 mmol/L    Urea Nitrogen 11.5 8.0 - 23.0 mg/dL    Creatinine 1.05 0.67 - 1.17 mg/dL    Calcium 8.7 (L) 8.8 - 10.2 mg/dL    Glucose 124 (H) 70 - 99 mg/dL    GFR Estimate 79 >60 mL/min/1.73m2   Troponin T, High Sensitivity   Result Value Ref Range    Troponin T, High Sensitivity 8 <=22 ng/L   Nt probnp inpatient (BNP)   Result Value Ref Range    N terminal Pro BNP Inpatient 158 0 - 900 pg/mL   CBC with platelets and differential   Result Value Ref Range    WBC Count 4.1 4.0 - 11.0 10e3/uL    RBC Count 4.56 4.40 - 5.90 10e6/uL    Hemoglobin 13.1 (L) 13.3 - 17.7 g/dL    Hematocrit 40.1 40.0 - 53.0 %    MCV 88 78 - 100  fL    MCH 28.7 26.5 - 33.0 pg    MCHC 32.7 31.5 - 36.5 g/dL    RDW 13.0 10.0 - 15.0 %    Platelet Count 148 (L) 150 - 450 10e3/uL    % Neutrophils 67 %    % Lymphocytes 21 %    % Monocytes 11 %    % Eosinophils 0 %    % Basophils 0 %    % Immature Granulocytes 1 %    NRBCs per 100 WBC 0 <1 /100    Absolute Neutrophils 2.8 1.6 - 8.3 10e3/uL    Absolute Lymphocytes 0.9 0.8 - 5.3 10e3/uL    Absolute Monocytes 0.5 0.0 - 1.3 10e3/uL    Absolute Eosinophils 0.0 0.0 - 0.7 10e3/uL    Absolute Basophils 0.0 0.0 - 0.2 10e3/uL    Absolute Immature Granulocytes 0.0 <=0.4 10e3/uL    Absolute NRBCs 0.0 10e3/uL   EKG 12-lead, tracing only   Result Value Ref Range    Systolic Blood Pressure  mmHg    Diastolic Blood Pressure  mmHg    Ventricular Rate 69 BPM    Atrial Rate 69 BPM    MO Interval 140 ms    QRS Duration 98 ms     ms    QTc 430 ms    P Axis 82 degrees    R AXIS 54 degrees    T Axis 65 degrees    Interpretation ECG       Sinus rhythm with sinus arrhythmia  Normal ECG  Unconfirmed report - interpretation of this ECG is computer generated - see medical record for final interpretation  Confirmed by - EMERGENCY ROOM, PHYSICIAN ([id number]),  ANGELO MINOR ([id number]) on 12/14/2022 2:56:41 PM     Symptomatic Influenza A/B & SARS-CoV2 (COVID-19) Virus PCR Multiplex Nasopharyngeal    Specimen: Nasopharyngeal; Swab   Result Value Ref Range    Influenza A PCR Negative Negative    Influenza B PCR Negative Negative    RSV PCR Negative Negative    SARS CoV2 PCR Negative Negative    Narrative    Testing was performed using the Xpert Xpress CoV2/Flu/RSV Assay on the [employer] GeneXpert Instrument. This test should be ordered for the detection of SARS-CoV-2 and influenza viruses in individuals who meet clinical and/or epidemiological criteria. Test performance is unknown in asymptomatic patients. This test is for in vitro diagnostic use under the FDA EUA for laboratories certified under CLIA to perform high or moderate complexity  testing. This test has not been FDA cleared or approved. A negative result does not rule out the presence of PCR inhibitors in the specimen or target RNA in concentration below the limit of detection for the assay. If only one viral target is positive but coinfection with multiple targets is suspected, the sample should be re-tested with another FDA cleared, approved, or authorized test, if coinfection would change clinical management. This test was validated by the [facility]. These laboratories are certified under the Clinical Laboratory Improvement Amendments of 1988 (CLIA-88) as qualified to perform high complexity laboratory testing.   XR Chest 2 Views    Narrative    Exam: XR CHEST 2 VIEWS, 12/14/2022 3:51 PM    Indication: cough, SOB    Comparison: Chest CT 2/8/2022    Findings:   PA and lateral views of the chest. Heart size within normal limits. No  pneumothorax or pleural effusions. No focal airspace opacities. Old  right-sided rib fracture deformities.      Impression    Impression: No acute cardiopulmonary findings.    I have personally reviewed the examination and initial interpretation  and I agree with the findings.    EMI HUITRON MD         SYSTEM ID:  [id number]     Medications - No data to display          Assessments & Plan (with Medical Decision Making)   Patient presents with cough for the last week, exertional chest pain for the last several weeks or months.  Chest x-ray is negative for pneumonia, viral testing is negative for influenza, COVID, RSV.  He has not had previous cardiac work-up and I am concerned that this exertional chest pain could be indication of coronary artery disease.  We will plan for admission to the observation unit overnight for telemetry monitoring, troponin, stress test.  Patient is still concerned about his cough.  I advised that he can try ibuprofen, Benadryl, humidifier to help with the cough if needed.  I suspect the cough is due to a viral  illness.    I have reviewed the nursing notes.    I have reviewed the findings, diagnosis, plan and need for follow up with the patient.    New Prescriptions    No medications on file       Final diagnoses:   Atypical chest pain   Acute cough         12/14/2022   [facility] EMERGENCY DEPARTMENT     Zahida Miles MD  12/14/22 [id number]

## 2022-12-14 NOTE — ED TRIAGE NOTES
Pt comes into to triage for shortness of breath, headaches, coughing and low back pain due to the coughing. Pt was tested for Covid last week and that was negative. Was around someone with the Flu but hasn't been tested. Had a fever last week and has been having low oxygen at home down to 88%. No significant cardiac or respiratory history per report     Triage Assessment     Row Name 12/14/22 3663       Triage Assessment (Adult)    Airway WDL WDL       Respiratory WDL    Respiratory WDL X;all;cough    Rhythm/Pattern, Respiratory shortness of breath    Cough Type nonproductive       Skin Circulation/Temperature WDL    Skin Circulation/Temperature WDL WDL       Cardiac WDL    Cardiac WDL X;all;chest pain  midsternal that comes and goes with cough       Peripheral/Neurovascular WDL    Peripheral Neurovascular WDL WDL       Cognitive/Neuro/Behavioral WDL    Cognitive/Neuro/Behavioral WDL WDL               Asthma    Bipolar disorder    Chronic back pain    Chronic pain of left knee    Diabetes    Gastritis    HLD (hyperlipidemia)    HTN (hypertension)

## 2022-12-14 NOTE — ED NOTES
"ED Triage Provider Note  [facility]  Encounter Date: Dec 14, 2022    History:  Chief Complaint   Patient presents with     Shortness of Breath     Karlo Townsend is a 67 year old male who presents to the ED with cough, hypoxia at the group home and feeling short of breath.  Reports feeling generally unwell.  No known COVID or influenza exposures.  Endorses some chest pain on and off for the last week or so.      Review of Systems:  Chest pain.    Exam:  /67   Pulse 81   Temp 98.7  F (37.1  C) (Oral)   Resp 17   Ht 1.88 m (6' 2\")   Wt 95.3 kg (210 lb)   SpO2 96%   BMI 26.96 kg/m    General: No acute distress. Appears stated age.   Cardio: Regular rate, extremities well perfused  Resp: Normal work of breathing, grossly normal respiratory rate.  Lungs clear to auscultation.  Neuro: Alert. CN II-XII grossly intact. Grossly intact strength.       Medical Decision Making:  Patient arriving to the ED with problem as above. A medical screening exam was performed.  orders initiated from Triage. The patient is appropriate to wait in triage.      Sagar Caldera MD on 12/14/2022 at 2:01 PM       Sagar Caldera MD  12/17/22 0624    "

## 2022-12-15 ENCOUNTER — APPOINTMENT (OUTPATIENT)
Dept: CARDIOLOGY | Facility: CLINIC | Age: 67
End: 2022-12-15
Attending: NURSE PRACTITIONER
Payer: MEDICARE

## 2022-12-15 VITALS
HEART RATE: 81 BPM | OXYGEN SATURATION: 97 % | SYSTOLIC BLOOD PRESSURE: 114 MMHG | RESPIRATION RATE: 16 BRPM | TEMPERATURE: 99.4 F | HEIGHT: 74 IN | DIASTOLIC BLOOD PRESSURE: 72 MMHG | BODY MASS INDEX: 26.95 KG/M2 | WEIGHT: 210 LBS

## 2022-12-15 LAB
BACTERIA SPT CULT: NORMAL
GLUCOSE BLDC GLUCOMTR-MCNC: 123 MG/DL (ref 70–99)
GRAM STAIN RESULT: NORMAL

## 2022-12-15 PROCEDURE — G0378 HOSPITAL OBSERVATION PER HR: HCPCS | Mod: CS

## 2022-12-15 PROCEDURE — 93018 CV STRESS TEST I&R ONLY: CPT | Performed by: INTERNAL MEDICINE

## 2022-12-15 PROCEDURE — 250N000013 HC RX MED GY IP 250 OP 250 PS 637: Performed by: NURSE PRACTITIONER

## 2022-12-15 PROCEDURE — 93325 DOPPLER ECHO COLOR FLOW MAPG: CPT | Mod: 26 | Performed by: INTERNAL MEDICINE

## 2022-12-15 PROCEDURE — 255N000002 HC RX 255 OP 636: Performed by: INTERNAL MEDICINE

## 2022-12-15 PROCEDURE — 250N000009 HC RX 250: Performed by: INTERNAL MEDICINE

## 2022-12-15 PROCEDURE — 258N000003 HC RX IP 258 OP 636: Performed by: INTERNAL MEDICINE

## 2022-12-15 PROCEDURE — 999N000157 HC STATISTIC RCP TIME EA 10 MIN

## 2022-12-15 PROCEDURE — C8928 TTE W OR W/O FOL W/CON,STRES: HCPCS

## 2022-12-15 PROCEDURE — 93016 CV STRESS TEST SUPVJ ONLY: CPT | Performed by: INTERNAL MEDICINE

## 2022-12-15 PROCEDURE — 82962 GLUCOSE BLOOD TEST: CPT

## 2022-12-15 PROCEDURE — 99217 PR OBSERVATION CARE DISCHARGE: CPT | Performed by: PHYSICIAN ASSISTANT

## 2022-12-15 PROCEDURE — 93350 STRESS TTE ONLY: CPT | Mod: 26 | Performed by: INTERNAL MEDICINE

## 2022-12-15 PROCEDURE — 93321 DOPPLER ECHO F-UP/LMTD STD: CPT | Mod: 26 | Performed by: INTERNAL MEDICINE

## 2022-12-15 PROCEDURE — 94640 AIRWAY INHALATION TREATMENT: CPT

## 2022-12-15 PROCEDURE — 93321 DOPPLER ECHO F-UP/LMTD STD: CPT | Mod: TC

## 2022-12-15 PROCEDURE — 250N000011 HC RX IP 250 OP 636: Performed by: INTERNAL MEDICINE

## 2022-12-15 PROCEDURE — 250N000009 HC RX 250: Performed by: PHYSICIAN ASSISTANT

## 2022-12-15 RX ORDER — METOPROLOL TARTRATE 1 MG/ML
1-20 INJECTION, SOLUTION INTRAVENOUS
Status: ACTIVE | OUTPATIENT
Start: 2022-12-15 | End: 2022-12-15

## 2022-12-15 RX ORDER — IPRATROPIUM BROMIDE AND ALBUTEROL SULFATE 2.5; .5 MG/3ML; MG/3ML
3 SOLUTION RESPIRATORY (INHALATION) ONCE
Status: COMPLETED | OUTPATIENT
Start: 2022-12-15 | End: 2022-12-15

## 2022-12-15 RX ORDER — DOBUTAMINE HYDROCHLORIDE 200 MG/100ML
10-50 INJECTION INTRAVENOUS CONTINUOUS
Status: ACTIVE | OUTPATIENT
Start: 2022-12-15 | End: 2022-12-15

## 2022-12-15 RX ORDER — SODIUM CHLORIDE 9 MG/ML
INJECTION, SOLUTION INTRAVENOUS CONTINUOUS
Status: ACTIVE | OUTPATIENT
Start: 2022-12-15 | End: 2022-12-15

## 2022-12-15 RX ORDER — ATROPINE SULFATE 0.4 MG/ML
.2-2 AMPUL (ML) INJECTION
Status: COMPLETED | OUTPATIENT
Start: 2022-12-15 | End: 2022-12-15

## 2022-12-15 RX ORDER — GUAIFENESIN 200 MG/10ML
10 LIQUID ORAL EVERY 4 HOURS PRN
Status: DISCONTINUED | OUTPATIENT
Start: 2022-12-15 | End: 2022-12-15 | Stop reason: HOSPADM

## 2022-12-15 RX ORDER — GUAIFENESIN 200 MG/10ML
10 LIQUID ORAL EVERY 4 HOURS PRN
COMMUNITY
Start: 2022-12-15

## 2022-12-15 RX ORDER — GUAIFENESIN 200 MG/10ML
10 LIQUID ORAL EVERY 4 HOURS PRN
Status: CANCELLED | COMMUNITY
Start: 2022-12-15

## 2022-12-15 RX ORDER — BENZONATATE 100 MG/1
100 CAPSULE ORAL 3 TIMES DAILY PRN
Status: DISCONTINUED | OUTPATIENT
Start: 2022-12-15 | End: 2022-12-15 | Stop reason: HOSPADM

## 2022-12-15 RX ADMIN — DOBUTAMINE 10 MCG/KG/MIN: 12.5 INJECTION, SOLUTION, CONCENTRATE INTRAVENOUS at 09:29

## 2022-12-15 RX ADMIN — ATROPINE SULFATE 0.4 MG: 0.4 INJECTION, SOLUTION INTRAVENOUS at 09:36

## 2022-12-15 RX ADMIN — METOPROLOL TARTRATE 5 MG: 5 INJECTION INTRAVENOUS at 09:41

## 2022-12-15 RX ADMIN — PERFLUTREN 14 ML: 6.52 INJECTION, SUSPENSION INTRAVENOUS at 09:45

## 2022-12-15 RX ADMIN — PANTOPRAZOLE SODIUM 40 MG: 40 TABLET, DELAYED RELEASE ORAL at 08:27

## 2022-12-15 RX ADMIN — LAMOTRIGINE 300 MG: 150 TABLET ORAL at 08:27

## 2022-12-15 RX ADMIN — IPRATROPIUM BROMIDE AND ALBUTEROL SULFATE 3 ML: .5; 3 SOLUTION RESPIRATORY (INHALATION) at 10:38

## 2022-12-15 RX ADMIN — LEVETIRACETAM 1000 MG: 500 TABLET, FILM COATED ORAL at 08:27

## 2022-12-15 RX ADMIN — AMLODIPINE BESYLATE 10 MG: 10 TABLET ORAL at 08:27

## 2022-12-15 ASSESSMENT — ACTIVITIES OF DAILY LIVING (ADL)
ADLS_ACUITY_SCORE: 35
ADLS_ACUITY_SCORE: 31

## 2022-12-15 NOTE — H&P
[facility]    History and Physical - Hospitalist Service       Date of Admission:  12/14/2022    Assessment & Plan      Karlo Townsend is a 67 year old male admitted on 12/14/2022. He has a history of TBI, seizure disorder, current tobacco abuse, hypertension, hyperlipidemia and type 2 diabetes. He presents to the ED with chest pain, SOB, Headache and low back pain.    # Chest pain  # Cough  # CAD  History may be unreliable due to traumatic brain injury.  Patient lives in a group home.  Patient reports having chest pain for the past week. He attributes his chest pain as a result of a cough. He lives in a group home and one of his roommates tested positive for influenza. He underwent testing at his group home and was negative. His oxygen level was checked during routine vital checks and his O2 was noted to be 88%. He is a current smoker.   Patient states that he has had exertional chest pain for a while and has not had a previous cardiac work-up.  Denies any history of known heart disease or lung disease.  He denies fever, sore throat, runny nose.  No abdominal pain. Patient was seen by cardiology in March for incidental finding of  coronary artery calcification seen on CT scan. CT scan was completed as part of a cancer screening. From that appointment, an exercise stress test was ordered, but was not completed.  In the ED: Vitals:BP:133/73 Pulse: 77 Temp: 97.7 Resp: 16 SP02:98%  Labs:Na 141, K 3.8 Cr 1.05, BUN 11.5,  , , Troponin 8/12,  WBC 4.1, Hgb 13.1, Plts 148, COVID Negative, Influenza A and B negative, RSV negative, Sputum culture pending. Medications: none  Imaging: Chest xray negative for infections, shows old rib fractures, EKG with no ST-T changes. Consults: None Plan: Admit to ED observation for  Risk factors include, HTN, HLD and DM2  - Continuous cardiac monitoring  - Continuous sat monitoring  - Dobutamine stress test in am  - Albuterol  "nebs prn  - Cepacol lozenges, Tessalon perils, Robitussin prn     # DMII  Patient is on Lantus 22 Units HS.  Latest hemoglobin A1c was 7.3 on 11/29/22   - Half Lantus for NPO status.     # Hypertension   - Continue PTA amlodipine    # Hyperlipidemia   - Continue PTA lipitor    # H/O TBI in 2010  # H/O seizure, most recently >5 years ago  Follows with neurology yearly, stable.   - Continue PTA Keppra and Lamictal.       Diet:  Reg no caffeine/ NPO at midnight   DVT Prophylaxis: Ambulate every shift  Law Catheter: Not present  Central Lines: None  Cardiac Monitoring: None  Code Status:  Full       Disposition Plan      Expected Discharge Date: 12/15/2022                The patient's care was discussed with the Bedside Nurse, Patient and ED Physician, Dr. Miles.    ______________________________________________________________________    Chief Complaint   Chest pain, SOB    History is obtained from the patient and chart review     History of Present Illness   Per ED note, \"Karlo Townsend is a 67 year old male with a history notable for DM II, HTN, hyperlipidemia, seizure disorder and tobacco use who presents to the ED for evaluation of chest pain, cough, shortness of breath, headache and low back pain. He states he has had chest pain for one week, comes and goes and is worse with activity and with coughing. He had negative COVID-19 testing last week. He notes that at home his O2 sats dropped to 88% yesterday and were 89% this morning.  He notes prior history of pneumonia, no heart or lung problems though he is a smoker.  Patient lives in a group home and there was another resident at the group home that was recently positive for influenza.  Patient states that he has had exertional chest pain for a while and has not had a previous cardiac work-up.  Denies any history of known heart disease or lung disease.  He denies fever, sore throat, runny nose.  No abdominal pain.  He is here with his group home " "worker.\"    Review of Systems    The 10 point Review of Systems is negative other than noted in the HPI or here. Cough, Chest pain improved.     Past Medical History    I have reviewed this patient's medical history and updated it with pertinent information if needed.   Past Medical History:   Diagnosis Date     Current tobacco use      Diabetes mellitus (H)      Esophageal reflux      HTN (hypertension)      Seizure disorder (H)      TBI (traumatic brain injury)        Past Surgical History   I have reviewed this patient's surgical history and updated it with pertinent information if needed.  Past Surgical History:   Procedure Laterality Date     APPENDECTOMY       BRONCHOSCOPY FLEXIBLE AND RIGID N/A 3/25/2022    Procedure: flexible bronchoscopy, therapeutic suctioning;  Surgeon: Chela Magana MD;  Location: [facility] OR     MANDIBLE SURGERY         Social History   I have reviewed this patient's social history and updated it with pertinent information if needed.  Social History     Tobacco Use     Smoking status: Every Day     Packs/day: 0.50     Years: 40.00     Pack years: 20.00     Types: Cigarettes     Smokeless tobacco: Never   Vaping Use     Vaping Use: Never used   Substance Use Topics     Alcohol use: Not Currently     Drug use: Never       Prior to Admission Medications   Prior to Admission Medications   Prescriptions Last Dose Informant Patient Reported? Taking?   LamoTRIgine (LAMICTAL XR) 300 MG 24 hr tablet   Yes No   Sig: Take 300 mg by mouth every morning   acetaminophen (TYLENOL) 325 MG tablet   Yes No   Sig: Take 325-650 mg by mouth every 6 hours as needed for mild pain   alcohol swab prep pads   No No   Sig: Use to swab area of injection/gatito as directed   amLODIPine (NORVASC) 10 MG tablet   No No   Sig: Take 1 tablet (10 mg) by mouth every morning   atorvastatin (LIPITOR) 40 MG tablet   No No   Sig: TAKE 1 TABLET [40MG]  BY MOUTH ONCE DAILY Strength: 40 mg   blood glucose (ACCU-CHEK VANIA PLUS) " test strip   No No   Sig: TEST TWICE DAILY   blood glucose (NO BRAND SPECIFIED) test strip   No No   Sig: Use to test blood sugar 1 times daily or as directed.   blood glucose calibration (NO BRAND SPECIFIED) solution   No No   Sig: To accompany: Blood Glucose Monitor Brands: per insurance.   blood glucose monitoring (NO BRAND SPECIFIED) meter device kit   No No   Sig: Use to test blood sugar 1 times daily or as directed.   diphenhydrAMINE HCl (BENADRYL ALLERGY PO)   Yes No   ibuprofen (ADVIL/MOTRIN) 200 MG tablet   Yes No   Sig: Take 200 mg by mouth every 4 hours as needed for mild pain   insulin glargine (LANTUS SOLOSTAR) 100 UNIT/ML pen   No No   Sig: INJECT 22 UNITS SUBCUTANEOUSLY ONCE DAILY *DISCARD PEN 28 DAYS AFTER FIRST USE, DO NOT REFRIGERATE AFTER FIRST USE* (5 PENS MUST LAST 62 DAYS)   insulin pen needle (B-D U/F) 31G X 5 MM miscellaneous   No No   Sig: by Device route daily Use 1 pen needles daily or as directed.   levETIRAcetam (KEPPRA) 1000 MG tablet   Yes No   Sig: Take 1,000 mg by mouth 2 times daily    loperamide (IMODIUM) 2 MG capsule   Yes No   Sig: Take 2 mg by mouth 4 times daily as needed for diarrhea   omeprazole (PRILOSEC) 20 MG DR capsule   No No   Sig: Take 1 capsule (20 mg) by mouth every morning   sodium chloride (OCEAN) 0.65 % nasal spray   Yes No   Sig: Spray 1 spray into both nostrils daily as needed for congestion   thin (NO BRAND SPECIFIED) lancets   No No   Sig: Use to test blood sugar 2 times daily or as directed. To accompany: Blood Glucose Monitor Brands: per insurance.      Facility-Administered Medications: None     Allergies   Allergies   Allergen Reactions     Valproic Acid Other (See Comments)     Confusion/Sedation     Penicillins        Physical Exam   Vital Signs: Temp: 99.2  F (37.3  C) Temp src: Oral BP: 132/71 Pulse: 77   Resp: 16 SpO2: 96 % O2 Device: None (Room air)    Weight: 210 lbs 0 oz    GEN:  Alert, well developed, no acute distress  HEENT:  PERRL, EOMI, Mucous  membranes are moist.   Cardio:  RRR, no murmur, radial pulses equal bilaterally  PULM:  Lungs have good air movement, no wheezes or rales, but there are numerous rhonchi heard bilaterally throughout both lung fields  Abd:  Soft, normal bowel sounds, no focal tenderness  Back exam:  No CVA tenderness  Musculoskeletal:  normal range of motion, no lower extremity swelling or calf tenderness  Neuro:  Alert and oriented X3, Follows commands, moving all extremities spontaneously   Skin:  Warm, dry     Data   Data reviewed today: I reviewed all medications, new labs and imaging results over the last 24 hours.    Recent Labs   Lab 12/14/22 2208 12/14/22 1428   WBC  --  4.1   HGB  --  13.1*   MCV  --  88   PLT  --  148*   NA  --  141   POTASSIUM  --  3.8   CHLORIDE  --  104   CO2  --  26   BUN  --  11.5   CR  --  1.05   ANIONGAP  --  11   MELISSA  --  8.7*   GLC 96 124*     Most Recent 3 CBC's:Recent Labs   Lab Test 12/14/22 1428 11/29/22  0954 03/25/22  1117   WBC 4.1 5.3  --    HGB 13.1* 14.8 14.0   MCV 88 87  --    * 127*  --      Most Recent 3 BMP's:Recent Labs   Lab Test 12/14/22 2208 12/14/22 1428 11/29/22 0954 03/25/22  1006 11/22/21  0923   NA  --  141 142  --  139   POTASSIUM  --  3.8 4.0  --  4.0   CHLORIDE  --  104 103  --  104   CO2  --  26 27  --  28   BUN  --  11.5 10.7  --  13   CR  --  1.05 0.91  --  1.09   ANIONGAP  --  11 12  --  7   MELISSA  --  8.7* 9.8  --  9.3   GLC 96 124* 135*   < > 93    < > = values in this interval not displayed.     Most Recent 2 LFT's:Recent Labs   Lab Test 11/29/22 0954 11/20/20  0920   AST 37 30   ALT 50 51   ALKPHOS 190* 136   BILITOTAL 0.4 0.5     Recent Results (from the past 24 hour(s))   XR Chest 2 Views    Narrative    Exam: XR CHEST 2 VIEWS, 12/14/2022 3:51 PM    Indication: cough, SOB    Comparison: Chest CT 2/8/2022    Findings:   PA and lateral views of the chest. Heart size within normal limits. No  pneumothorax or pleural effusions. No focal airspace  opacities. Old  right-sided rib fracture deformities.      Impression    Impression: No acute cardiopulmonary findings.    I have personally reviewed the examination and initial interpretation  and I agree with the findings.    EMI HUITRON MD         SYSTEM ID:  [id number]

## 2022-12-15 NOTE — PROGRESS NOTES
Pt here for dobutamine stress test.  Test, meds and side effects reviewed with patient.  Achieved target HR at 30 mcg Dobutamine and a total of 0.4 mg IV atropine.  Gave a total of 5 mg IV Metoprolol to bring HR back to baseline.  Post monitoring complete and VSS.  Pt transferred back to obs unit via transport.    Report given to jenny Leal RN.    Mitali Villatoro RN

## 2022-12-15 NOTE — PLAN OF CARE
Goal Outcome Evaluation:     - Serial troponins and stress test complete. Not met. Stress test in the AM     - Seen and cleared by consultant if applicable. Not met      - Adequate pain control on oral analgesia. Met, denies pain      - Vital signs normal or at patient baseline. Met      - Safe disposition plan has been identified. Not met.

## 2022-12-15 NOTE — PROGRESS NOTES
DC instructions given to pt, verbalized understanding.  All belongings with pt, IV DC'd and documented.     Pt ambulated to ED Lobby for discharge to  via ride from [facility] contact.

## 2022-12-15 NOTE — PROGRESS NOTES
"S: Patient admitted to observation for chest pain that he says is improved now.  Patient also with chronic cough although somewhat getting better.  Patient does not use nebulizers history of smoking is noted patient's cardiologist wanted him to get further cardiac evaluation.  No other major complaints at this point patient been coughing up some sputum no hemoptysis otherwise.  O:/72 (BP Location: Right arm)   Pulse 76   Temp 99.3  F (37.4  C) (Oral)   Resp 16   Ht 1.88 m (6' 2\")   Wt 95.3 kg (210 lb)   SpO2 94%   BMI 26.96 kg/m    General patient though pleasant this occasional tight cough noted.  No respiratory stress.  Vitally stable.  Lungs some diminished breath sounds bilateral.  No marked rales identified.  Cardiovascular regular rate abdomen benign neuro is nonfocal.  A: Chest pain and cough  P: Plan for dobutamine stress this morning we will try DuoNeb to see if this helps his cough it may be some component of COPD also.  Could consider spirometry but I think in this acute phase probably less likely will treat symptomatically him follow-up with his primary doctor regarding this to will follow-up on dobutamine stress make further recommendations.    "

## 2022-12-15 NOTE — DISCHARGE SUMMARY
[facility]  ED Observation Discharge Summary      Date of Admission:  12/14/2022  Date of Discharge:  12/15/2022  Discharging Provider: Ingrid Chahal PA-C  Discharge Service: ED Observation Service    Discharge Diagnoses   Chest pain  DM II  HTN  HLD  History of TBI  Seizure disorder    Follow-ups Needed After Discharge   Follow up with PCP in 1 week    Unresulted Labs Ordered in the Past 30 Days of this Admission     No orders found for last 31 day(s).        Discharge Disposition   Discharge back to group home  Condition at discharge: Stable    Hospital Course   Karlo Townsend is a 67 year old male admitted on 12/14/2022. He has a history of TBI, seizure disorder, current tobacco abuse, hypertension, hyperlipidemia and type 2 diabetes. He presents to the ED with chest pain, SOB, Headache and low back pain.     #Chest pain  History may be unreliable due to traumatic brain injury. Patient lives in a group home.  Patient reports having chest pain for the past week. He attributes his chest pain as a result of a cough. He lives in a group home and one of his roommates tested positive for influenza. He underwent testing at his group home and was negative. His oxygen level was checked during routine vital checks and his O2 was noted to be 88%. He is a current smoker. Patient states that he has had exertional chest pain for a while and has not had a previous cardiac work-up. Denies any history of known heart disease or lung disease. He denies fever, sore throat, runny nose. No abdominal pain. Patient was seen by cardiology in March for incidental finding of coronary artery calcification seen on CT scan. CT scan was completed as part of a cancer screening. From that appointment, an exercise stress test was ordered, but was not completed. In the ED, VSS. CBC, BMP unremarkable. Troponin 8, repeat 12. COVID/influenza/RSV negative. CXR NAD. EKG with no ST-T changes.  Admitted to ED observation for further management.    Patient underwent dobutamine stress echo which was normal. He can continue Robitussin as needed. Remains afebrile, saturating appropriately on room air.     #DM II  Patient is on Lantus 22 Units HS.  Latest hemoglobin A1c was 7.3 on 11/29/22      #Hypertension   - Continue PTA amlodipine     #Hyperlipidemia   - Continue PTA lipitor     #H/O TBI in 2010  #H/O seizure, most recently >5 years ago  Follows with neurology yearly, stable.   - Continue PTA Keppra and Lamictal.     Consultations This Hospital Stay   None    Code Status   Full Code    Time Spent on this Encounter   I, Ingrid Chahal PA-C, personally saw the patient today and spent greater than 30 minutes discharging this patient.     Ingrid Chahal PA-C  [facility]  [address] [place] [id number]  Phone: [phone]  Fax: [phone]  ______________________________________________________________________    Physical Exam   Vital Signs: Temp: 99.4  F (37.4  C) Temp src: Oral BP: 114/72 Pulse: 81   Resp: 16 SpO2: 97 % O2 Device: None (Room air)    Weight: 210 lbs 0 oz  Exam:  Constitutional: alert, no distress, and cooperative  Head: normocephalic, atraumatic  Neck: no asymmetry, masses, or scars  ENT: throat normal without erythema or exudate  Cardiovascular: RRR  Respiratory: diminished, respirations unlabored  Gastrointestinal: (+) BS, non tender, soft  Musculoskeletal: normal muscle tone, no pitting edema  Skin: no suspicious lesions or rashes  Neurologic: oriented x 3, moves all extremities, no slurred speech  Psychiatric: normal affect and mood       Primary Care Physician   Reji Nguyen MD    Discharge Orders      Reason for your hospital stay    You were hospitalized for chest pain. Your stress test was normal. You reported a cough, but your chest xray shows no pneumonia and you tested negative for influenza, RSV,  and COVID-19. You can take Robitussin as needed for your cough. Resume your remaining home medications.     Activity    Your activity upon discharge: activity as tolerated     Follow Up and recommended labs and tests    Follow up with primary care provider, Reji Nguyen MD, within 7 days for hospital follow- up.     Diet    Follow this diet upon discharge: regular diet       Significant Results and Procedures   Results for orders placed or performed during the hospital encounter of 22   XR Chest 2 Views    Narrative    Exam: XR CHEST 2 VIEWS, 2022 3:51 PM    Indication: cough, SOB    Comparison: Chest CT 2022    Findings:   PA and lateral views of the chest. Heart size within normal limits. No  pneumothorax or pleural effusions. No focal airspace opacities. Old  right-sided rib fracture deformities.      Impression    Impression: No acute cardiopulmonary findings.    I have personally reviewed the examination and initial interpretation  and I agree with the findings.    EMI HUITRON MD         SYSTEM ID:  [id number]   Dobutamine Stress Echocardiogram    Narrative    [id number]  [facility]  [id number]  [id number]^MELA^CHAY^GREGOR     [facility],[place]  Echocardiography Laboratory  [address] [place] [id number]     Name: TAYLER AGUIRRE  MRN: [id number]  : 1955  Study Date: 12/15/2022 09:04 AM  Age: 67 yrs  Gender: Male  Patient Location: [facility]  Reason For Study: Chest Pain  Ordering Physician: CHAY PLAZA  Performed By: Jaylene Domingo     BSA: 2.2 m2  Height: 74 in  Weight: 210 lb  ______________________________________________________________________________  ______________________________________________________________________________  Interpretation Summary  Normal, low-risk dobutamine echocardiogram without evidence of ischemia.  The target heart rate was achieved.  Normal biventricular size, thickness, and global systolic function  at  baseline, LVEF=55-60%.  With low-dose dobutamine, LVEF augmented and LV cavity size decreased  appropriately.  With peak dobutamine, LVEF increased further to >70% and LV cavity size  decreased appropriately.  No regional wall motion abnormalities at rest or with dobutamine.  No angina was elicited.  No ECG evidence of ischemia. Occasional PACs and PVCs present. Normal heart  rate and blood pressure response to dobutamine.  No significant valvular abnormalities are noted on screening Doppler exam.  The aortic root and visualized ascending aorta are normal.  ______________________________________________________________________________  Stress  The drug infusion was stopped due to target heart rate achieved.  The patient did not exhibit any symptoms during drug infusion.  The maximum dose of dobutamine was 30mcg/kg/min.  The maximum dose of atropine was 0.4mg.  The maximum dose of metoprolol was 5mg.  Definity (NDC #19423-590-72) given intravenously.  Patient was given 14ml mixture of 1.5ml Definity and 8.5ml saline.  6 ml wasted.  Definity Expiration 07/2023 .  Definity Lot # [id number] .  Occasional premature ventricular contractions.  Occasional premature atrial contractions.  There was no new ST segment depression.     Baseline  Resting ECG is normal.     Stress Results                                       Maximum Predicted HR:   155 bpm             Target HR: 132 bpm        % Maximum Predicted HR: 92 %                                 DurationHeart Rate                        Stage  (mm:ss)   (bpm)    BP   Dose                      Baseline  0:00      72    132/64 0.00                        Peak    8:36      142   162/6630.00                          Stress Duration:   8:36 mm:ss                       Maximum Stress HR: 142 bpm     Left Ventricle  The left ventricle is normal in structure, function and size.     Right Ventricle  The right ventricle is normal in structure, function and size.     Mitral Valve  The  mitral valve is normal in structure and function.     Tricuspid Valve  The tricuspid valve is normal in structure and function.     Aortic Valve  The aortic valve is normal in structure and function.     Procedure  Dobutamine stress echo with two dimensional color and spectral Doppler  performed. Definity (NDC #53462-133) given intravenously.     Attestation  I was present and supervised the stress test. I personally viewed the imaging  and agree with the interpretation and report as documented by the fellow,  Charlene Bang.     ______________________________________________________________________________  MMode/2D Measurements & Calculations  asc Aorta Diam: 2.9 cm     ______________________________________________________________________________  Report approved by: Parris Davenport 12/15/2022 10:40 AM               Discharge Medications   Current Discharge Medication List      START taking these medications    Details   guaiFENesin (ROBITUSSIN) 20 mg/mL liquid Take 10 mLs by mouth every 4 hours as needed for cough    Associated Diagnoses: Acute cough         CONTINUE these medications which have NOT CHANGED    Details   acetaminophen (TYLENOL) 325 MG tablet Take 325-650 mg by mouth every 6 hours as needed for mild pain      alcohol swab prep pads Use to swab area of injection/gatito as directed  Qty: 100 each, Refills: 3    Associated Diagnoses: Type 2 diabetes mellitus without complication, with long-term current use of insulin (H)      amLODIPine (NORVASC) 10 MG tablet Take 1 tablet (10 mg) by mouth every morning  Qty: 90 tablet, Refills: 1    Associated Diagnoses: Essential hypertension      atorvastatin (LIPITOR) 40 MG tablet TAKE 1 TABLET [40MG]  BY MOUTH ONCE DAILY Strength: 40 mg  Qty: 90 tablet, Refills: 1    Associated Diagnoses: Type 2 diabetes mellitus without complication, with long-term current use of insulin (H)      !! blood glucose (ACCU-CHEK VANIA PLUS) test strip TEST TWICE DAILY  Qty:  200 strip, Refills: 3    Associated Diagnoses: Type 2 diabetes mellitus without complication, with long-term current use of insulin (H)      !! blood glucose (NO BRAND SPECIFIED) test strip Use to test blood sugar 1 times daily or as directed.  Qty: 100 strip, Refills: 6    Associated Diagnoses: Type 2 diabetes mellitus without complication, with long-term current use of insulin (H)      blood glucose calibration (NO BRAND SPECIFIED) solution To accompany: Blood Glucose Monitor Brands: per insurance.  Qty: 1 each, Refills: 1    Associated Diagnoses: Type 2 diabetes mellitus without complication, with long-term current use of insulin (H)      blood glucose monitoring (NO BRAND SPECIFIED) meter device kit Use to test blood sugar 1 times daily or as directed.  Qty: 1 kit, Refills: 0    Comments: Please dispense insurance approved device  Associated Diagnoses: Type 2 diabetes mellitus without complication, with long-term current use of insulin (H)      diphenhydrAMINE HCl (BENADRYL ALLERGY PO)       ibuprofen (ADVIL/MOTRIN) 200 MG tablet Take 200 mg by mouth every 4 hours as needed for mild pain      insulin glargine (LANTUS SOLOSTAR) 100 UNIT/ML pen INJECT 22 UNITS SUBCUTANEOUSLY ONCE DAILY *DISCARD PEN 28 DAYS AFTER FIRST USE, DO NOT REFRIGERATE AFTER FIRST USE* (5 PENS MUST LAST 62 DAYS)  Qty: 30 mL, Refills: 1    Comments: If Lantus is not covered by insurance, may substitute Basaglar or Semglee or other insulin glargine product per insurance preference at same dose and frequency.    Associated Diagnoses: Type 2 diabetes mellitus without complication, with long-term current use of insulin (H)      insulin pen needle (B-D U/F) 31G X 5 MM miscellaneous by Device route daily Use 1 pen needles daily or as directed.  Qty: 100 each, Refills: 1    Associated Diagnoses: Type 2 diabetes mellitus without complication, with long-term current use of insulin (H)      LamoTRIgine (LAMICTAL XR) 300 MG 24 hr tablet Take 300 mg by  mouth every morning      levETIRAcetam (KEPPRA) 1000 MG tablet Take 1,000 mg by mouth 2 times daily       loperamide (IMODIUM) 2 MG capsule Take 2 mg by mouth 4 times daily as needed for diarrhea      omeprazole (PRILOSEC) 20 MG DR capsule Take 1 capsule (20 mg) by mouth every morning  Qty: 90 capsule, Refills: 3    Associated Diagnoses: Gastroesophageal reflux disease without esophagitis      sodium chloride (OCEAN) 0.65 % nasal spray Spray 1 spray into both nostrils daily as needed for congestion      thin (NO BRAND SPECIFIED) lancets Use to test blood sugar 2 times daily or as directed. To accompany: Blood Glucose Monitor Brands: per insurance.  Qty: 100 each, Refills: 3    Associated Diagnoses: Type 2 diabetes mellitus without complication, with long-term current use of insulin (H)       !! - Potential duplicate medications found. Please discuss with provider.        Allergies   Allergies   Allergen Reactions     Valproic Acid Other (See Comments)     Confusion/Sedation     Penicillins

## 2022-12-16 ENCOUNTER — PATIENT OUTREACH (OUTPATIENT)
Dept: FAMILY MEDICINE | Facility: CLINIC | Age: 67
End: 2022-12-16

## 2022-12-16 NOTE — TELEPHONE ENCOUNTER
"I tried calling pts # on file, Arnel with pts group home answered.    Unable to do hospital follow-up as pt not with Arnel at the time.     Tried to offer pt an appointment with Dr. Bardales on Monday, Arnel stated that \"Karlo is racist, that may not work\".     Scheduled a hospital follow-up visit with Rina. Arnel did say patient may need a new PCP for the above reason as well.        ANAYELI OliveiraN RN  [facility]    "

## 2023-02-17 DIAGNOSIS — Z79.4 TYPE 2 DIABETES MELLITUS WITHOUT COMPLICATION, WITH LONG-TERM CURRENT USE OF INSULIN (H): ICD-10-CM

## 2023-02-17 DIAGNOSIS — E11.9 TYPE 2 DIABETES MELLITUS WITHOUT COMPLICATION, WITH LONG-TERM CURRENT USE OF INSULIN (H): ICD-10-CM

## 2023-02-17 RX ORDER — INSULIN GLARGINE 100 [IU]/ML
INJECTION, SOLUTION SUBCUTANEOUS
Qty: 30 ML | Refills: 3 | Status: SHIPPED | OUTPATIENT
Start: 2023-02-17 | End: 2023-09-19

## 2023-02-21 DIAGNOSIS — Z79.4 TYPE 2 DIABETES MELLITUS WITHOUT COMPLICATION, WITH LONG-TERM CURRENT USE OF INSULIN (H): ICD-10-CM

## 2023-02-21 DIAGNOSIS — E11.9 TYPE 2 DIABETES MELLITUS WITHOUT COMPLICATION, WITH LONG-TERM CURRENT USE OF INSULIN (H): ICD-10-CM

## 2023-02-24 RX ORDER — FLURBIPROFEN SODIUM 0.3 MG/ML
SOLUTION/ DROPS OPHTHALMIC DAILY
Qty: 100 EACH | Refills: 1 | Status: SHIPPED | OUTPATIENT
Start: 2023-02-24 | End: 2023-08-31

## 2023-03-07 ENCOUNTER — ANCILLARY PROCEDURE (OUTPATIENT)
Dept: CT IMAGING | Facility: CLINIC | Age: 68
End: 2023-03-07
Attending: FAMILY MEDICINE
Payer: MEDICARE

## 2023-03-07 DIAGNOSIS — Z87.891 PERSONAL HISTORY OF TOBACCO USE: ICD-10-CM

## 2023-03-07 PROCEDURE — 71271 CT THORAX LUNG CANCER SCR C-: CPT | Mod: GC | Performed by: RADIOLOGY

## 2023-04-15 ENCOUNTER — HEALTH MAINTENANCE LETTER (OUTPATIENT)
Age: 68
End: 2023-04-15

## 2023-04-24 ENCOUNTER — TELEPHONE (OUTPATIENT)
Dept: FAMILY MEDICINE | Facility: CLINIC | Age: 68
End: 2023-04-24
Payer: MEDICARE

## 2023-04-24 NOTE — TELEPHONE ENCOUNTER
Forms/Letter Request    Type of form/letter: MD orders    Have you been seen for this request: N/A    Do we have the form/letter: Yes: placed in care 2 providers sign folder    Who is the form from? [facility] (if other please explain)    Where did/will the form come from? form was faxed in    When is form/letter needed by: none given    How would you like the form/letter returned: Fax : [phone]

## 2023-08-29 DIAGNOSIS — Z79.4 TYPE 2 DIABETES MELLITUS WITHOUT COMPLICATION, WITH LONG-TERM CURRENT USE OF INSULIN (H): ICD-10-CM

## 2023-08-29 DIAGNOSIS — E11.9 TYPE 2 DIABETES MELLITUS WITHOUT COMPLICATION, WITH LONG-TERM CURRENT USE OF INSULIN (H): ICD-10-CM

## 2023-08-31 RX ORDER — PEN NEEDLE, DIABETIC 29 G X1/2"
NEEDLE, DISPOSABLE MISCELLANEOUS
Qty: 100 EACH | Refills: 0 | Status: SHIPPED | OUTPATIENT
Start: 2023-08-31 | End: 2023-12-04

## 2023-09-10 ENCOUNTER — HEALTH MAINTENANCE LETTER (OUTPATIENT)
Age: 68
End: 2023-09-10

## 2023-09-18 DIAGNOSIS — E11.9 TYPE 2 DIABETES MELLITUS WITHOUT COMPLICATION, WITH LONG-TERM CURRENT USE OF INSULIN (H): ICD-10-CM

## 2023-09-18 DIAGNOSIS — Z79.4 TYPE 2 DIABETES MELLITUS WITHOUT COMPLICATION, WITH LONG-TERM CURRENT USE OF INSULIN (H): ICD-10-CM

## 2023-09-19 RX ORDER — INSULIN GLARGINE 100 [IU]/ML
INJECTION, SOLUTION SUBCUTANEOUS
Qty: 15 ML | Refills: 0 | Status: SHIPPED | OUTPATIENT
Start: 2023-09-19 | End: 2023-09-27

## 2023-09-19 NOTE — TELEPHONE ENCOUNTER
I have signed prescription for 30 days. Please notify and help patient to schedule a follow up appointment before further refills.

## 2023-09-26 ENCOUNTER — LAB (OUTPATIENT)
Dept: LAB | Facility: CLINIC | Age: 68
End: 2023-09-26
Payer: MEDICARE

## 2023-09-26 DIAGNOSIS — E11.9 TYPE 2 DIABETES MELLITUS WITHOUT COMPLICATION, WITH LONG-TERM CURRENT USE OF INSULIN (H): Primary | ICD-10-CM

## 2023-09-26 DIAGNOSIS — Z79.4 TYPE 2 DIABETES MELLITUS WITHOUT COMPLICATION, WITH LONG-TERM CURRENT USE OF INSULIN (H): Primary | ICD-10-CM

## 2023-09-26 LAB — HBA1C MFR BLD: 8.2 % (ref 0–5.6)

## 2023-09-26 PROCEDURE — 83036 HEMOGLOBIN GLYCOSYLATED A1C: CPT

## 2023-09-26 PROCEDURE — 36415 COLL VENOUS BLD VENIPUNCTURE: CPT

## 2023-09-27 ENCOUNTER — OFFICE VISIT (OUTPATIENT)
Dept: FAMILY MEDICINE | Facility: CLINIC | Age: 68
End: 2023-09-27
Payer: MEDICARE

## 2023-09-27 ENCOUNTER — OFFICE VISIT (OUTPATIENT)
Dept: PHARMACY | Facility: CLINIC | Age: 68
End: 2023-09-27
Payer: COMMERCIAL

## 2023-09-27 VITALS
BODY MASS INDEX: 25.74 KG/M2 | OXYGEN SATURATION: 95 % | SYSTOLIC BLOOD PRESSURE: 153 MMHG | DIASTOLIC BLOOD PRESSURE: 77 MMHG | RESPIRATION RATE: 18 BRPM | HEART RATE: 85 BPM | WEIGHT: 200.6 LBS | HEIGHT: 74 IN

## 2023-09-27 DIAGNOSIS — Z79.4 TYPE 2 DIABETES MELLITUS WITHOUT COMPLICATION, WITH LONG-TERM CURRENT USE OF INSULIN (H): ICD-10-CM

## 2023-09-27 DIAGNOSIS — Z23 NEED FOR PROPHYLACTIC VACCINATION AND INOCULATION AGAINST INFLUENZA: Primary | ICD-10-CM

## 2023-09-27 DIAGNOSIS — K21.9 GASTROESOPHAGEAL REFLUX DISEASE WITHOUT ESOPHAGITIS: ICD-10-CM

## 2023-09-27 DIAGNOSIS — E11.9 TYPE 2 DIABETES MELLITUS WITHOUT COMPLICATION, WITHOUT LONG-TERM CURRENT USE OF INSULIN (H): Primary | ICD-10-CM

## 2023-09-27 DIAGNOSIS — Z87.820 HISTORY OF TRAUMATIC BRAIN INJURY: ICD-10-CM

## 2023-09-27 DIAGNOSIS — Z23 ENCOUNTER FOR IMMUNIZATION: ICD-10-CM

## 2023-09-27 DIAGNOSIS — M79.621 PAIN OF RIGHT UPPER ARM: ICD-10-CM

## 2023-09-27 DIAGNOSIS — E11.9 TYPE 2 DIABETES MELLITUS WITHOUT COMPLICATION, WITH LONG-TERM CURRENT USE OF INSULIN (H): ICD-10-CM

## 2023-09-27 DIAGNOSIS — R19.7 DIARRHEA, UNSPECIFIED TYPE: ICD-10-CM

## 2023-09-27 DIAGNOSIS — G40.909 SEIZURE DISORDER (H): ICD-10-CM

## 2023-09-27 DIAGNOSIS — I10 ESSENTIAL HYPERTENSION: ICD-10-CM

## 2023-09-27 LAB
ANION GAP SERPL CALCULATED.3IONS-SCNC: 12 MMOL/L (ref 7–15)
BUN SERPL-MCNC: 13.8 MG/DL (ref 8–23)
CALCIUM SERPL-MCNC: 9.6 MG/DL (ref 8.8–10.2)
CHLORIDE SERPL-SCNC: 104 MMOL/L (ref 98–107)
CHOLEST SERPL-MCNC: 122 MG/DL
CREAT SERPL-MCNC: 1 MG/DL (ref 0.67–1.17)
CREAT UR-MCNC: 283 MG/DL
DEPRECATED HCO3 PLAS-SCNC: 26 MMOL/L (ref 22–29)
EGFRCR SERPLBLD CKD-EPI 2021: 83 ML/MIN/1.73M2
GLUCOSE SERPL-MCNC: 157 MG/DL (ref 70–99)
HDLC SERPL-MCNC: 22 MG/DL
LDLC SERPL CALC-MCNC: 48 MG/DL
MICROALBUMIN UR-MCNC: 43.8 MG/L
MICROALBUMIN/CREAT UR: 15.48 MG/G CR (ref 0–17)
NONHDLC SERPL-MCNC: 100 MG/DL
POTASSIUM SERPL-SCNC: 4.3 MMOL/L (ref 3.4–5.3)
SODIUM SERPL-SCNC: 142 MMOL/L (ref 135–145)
TRIGL SERPL-MCNC: 261 MG/DL

## 2023-09-27 PROCEDURE — 90662 IIV NO PRSV INCREASED AG IM: CPT | Performed by: FAMILY MEDICINE

## 2023-09-27 PROCEDURE — 80061 LIPID PANEL: CPT | Performed by: FAMILY MEDICINE

## 2023-09-27 PROCEDURE — 99207 PR NO CHARGE LOS: CPT | Performed by: PHARMACIST

## 2023-09-27 PROCEDURE — 99214 OFFICE O/P EST MOD 30 MIN: CPT | Mod: 25 | Performed by: FAMILY MEDICINE

## 2023-09-27 PROCEDURE — 80048 BASIC METABOLIC PNL TOTAL CA: CPT | Performed by: FAMILY MEDICINE

## 2023-09-27 PROCEDURE — 36415 COLL VENOUS BLD VENIPUNCTURE: CPT | Performed by: FAMILY MEDICINE

## 2023-09-27 PROCEDURE — 82570 ASSAY OF URINE CREATININE: CPT | Performed by: FAMILY MEDICINE

## 2023-09-27 PROCEDURE — G0008 ADMIN INFLUENZA VIRUS VAC: HCPCS | Performed by: FAMILY MEDICINE

## 2023-09-27 PROCEDURE — 82043 UR ALBUMIN QUANTITATIVE: CPT | Performed by: FAMILY MEDICINE

## 2023-09-27 RX ORDER — INSULIN GLARGINE 100 [IU]/ML
INJECTION, SOLUTION SUBCUTANEOUS
Qty: 15 ML | Refills: 3 | Status: SHIPPED | OUTPATIENT
Start: 2023-09-27 | End: 2024-04-22

## 2023-09-27 RX ORDER — ATORVASTATIN CALCIUM 40 MG/1
TABLET, FILM COATED ORAL
Qty: 90 TABLET | Refills: 1 | Status: SHIPPED | OUTPATIENT
Start: 2023-09-27 | End: 2024-03-25

## 2023-09-27 RX ORDER — LANCETS
EACH MISCELLANEOUS
Qty: 100 EACH | Refills: 3 | Status: CANCELLED | OUTPATIENT
Start: 2023-09-27

## 2023-09-27 RX ORDER — PEN NEEDLE, DIABETIC 29 G X1/2"
NEEDLE, DISPOSABLE MISCELLANEOUS
Qty: 100 EACH | Refills: 0 | Status: CANCELLED | OUTPATIENT
Start: 2023-09-27

## 2023-09-27 RX ORDER — AMLODIPINE BESYLATE 10 MG/1
10 TABLET ORAL EVERY MORNING
Qty: 90 TABLET | Refills: 1 | Status: SHIPPED | OUTPATIENT
Start: 2023-09-27 | End: 2024-03-25

## 2023-09-27 RX ORDER — LAMOTRIGINE 150 MG/1
300 TABLET ORAL 2 TIMES DAILY
Qty: 360 TABLET | Refills: 3 | Status: SHIPPED | OUTPATIENT
Start: 2023-09-27

## 2023-09-27 RX ORDER — LEVETIRACETAM 1000 MG/1
1000 TABLET ORAL 2 TIMES DAILY
Qty: 180 TABLET | Refills: 3 | Status: SHIPPED | OUTPATIENT
Start: 2023-09-27

## 2023-09-27 RX ORDER — LOPERAMIDE HCL 2 MG
2 CAPSULE ORAL 4 TIMES DAILY PRN
Status: CANCELLED | OUTPATIENT
Start: 2023-09-27

## 2023-09-27 RX ORDER — LAMOTRIGINE 300 MG/1
300 TABLET, EXTENDED RELEASE ORAL EVERY MORNING
Status: CANCELLED | OUTPATIENT
Start: 2023-09-27

## 2023-09-27 RX ORDER — INSULIN GLARGINE 100 [IU]/ML
INJECTION, SOLUTION SUBCUTANEOUS
Qty: 15 ML | Refills: 0 | Status: CANCELLED | OUTPATIENT
Start: 2023-09-27

## 2023-09-27 RX ORDER — ACETAMINOPHEN 325 MG/1
325-650 TABLET ORAL EVERY 6 HOURS PRN
Qty: 100 TABLET | Refills: 1 | Status: SHIPPED | OUTPATIENT
Start: 2023-09-27

## 2023-09-27 NOTE — Clinical Note
Routing to you per MT protocol. Nothing we didn't already discuss in clinic.  Thanks for the collaboration!  Alicia

## 2023-09-27 NOTE — PROGRESS NOTES
Upper 120s (130s) (129/67, 134)    Smoke - about an hour and half/2hours before coming in     1%  Lost 10lbs recently     Tripped and fell in pain today     Apap - not taking at the moment     Date Fasting/Pre-Prandial Post-Prandial   9/27 151     135     132     154     130     126     152     159     164     187     159     164     187

## 2023-09-27 NOTE — PROGRESS NOTES
Karlo was seen today for new patient and imm/inj.    Diagnoses and all orders for this visit:    Need for prophylactic vaccination and inoculation against influenza    Type 2 diabetes mellitus without complication, with long-term current use of insulin (H).  At another clinic his A1c was measured at 8.2 yesterday.  Will have pharmacy team see the patient to increase Lantus dosing.  Goal will be A1c in the 7.0-7.9 range.  -     atorvastatin (LIPITOR) 40 MG tablet; TAKE 1 TABLET [40MG]  BY MOUTH ONCE DAILY Strength: 40 mg  -     Lipid panel reflex to direct LDL Fasting; Future  -     Basic metabolic panel; Future  -     Albumin Random Urine Quantitative with Creat Ratio; Future  -     Lipid panel reflex to direct LDL Fasting  -     Basic metabolic panel  -     Albumin Random Urine Quantitative with Creat Ratio  -     insulin glargine (LANTUS SOLOSTAR) 100 UNIT/ML pen; INJECT 25 UNITS SUBCUTANEOUSLY ONCE DAILY *DISCARD PEN 28 DAYS AFTER FIRST USE, DO NOT REFRIGERATE AFTER FIRST USE* (5 PENS MUST LAST 62 DAYS)    Gastroesophageal reflux disease without esophagitis.  Stable continue PPI.  -     omeprazole (PRILOSEC) 20 MG DR capsule; Take 1 capsule (20 mg) by mouth every morning    Essential hypertension.  At goal continue amlodipine.  -     amLODIPine (NORVASC) 10 MG tablet; Take 1 tablet (10 mg) by mouth every morning    Encounter for immunization  -     INFLUENZA VACCINE 65+ (FLUZONE HD)    Seizure disorder (H).  This is secondary to a severe brain injury.  He has been stable on dual therapy.  Sees neurology once a year.  Continue current dosages of levetiracetam and lamotrigine.  -     levETIRAcetam (KEPPRA) 1000 MG tablet; Take 1 tablet (1,000 mg) by mouth 2 times daily  -     lamoTRIgine (LAMICTAL) 150 MG tablet; Take 2 tablets (300 mg) by mouth 2 times daily    History of traumatic brain injury.  See above.  He lives in a group home and all functional needs are being met.    Pain of right upper arm.  On testing  Patient on oxygen with documented flow.  Will attempt to wean per O2 order protocol.  The proper method of use, as well as anticipated side effects, of this aerosol treatment are discussed and demonstrated to the patient.   The proper method of use, as well as anticipated side effects, of this metered-dose inhaler are discussed and demonstrated to the patient.  Will continue to monitor.     "he has weakness of infraspinatus function on the right.  Likely this is due to mild strain.  I taught him some exercises that he can do to rehabilitate the muscle and we will have him on scheduled naproxen for 10 days.  -     acetaminophen (TYLENOL) 325 MG tablet; Take 1-2 tablets (325-650 mg) by mouth every 6 hours as needed for mild pain  -     naproxen (NAPROSYN) 375 MG tablet; Take 1 tablet (375 mg) by mouth 2 times daily (with meals)                  Subjective     New Patient (Establishing care with [facility] ) and Imm/Inj (Flu Shot)        HPI   New patient visit to establish care.  This is a  suffered a severe brain injury over a decade ago.  He currently lives in a group home.  Reports no complaints except for right upper arm pain.  This has been going on for couple of weeks when he fell, striking the upper portion of his arm on the top of a standing vacuum .  The area just inferior and posterior to the deltoid muscle has been quite sore since then, relieved only by hot showers and massage.  He has tried Tylenol which has been completely ineffective in controlling the pain.        Review of Systems         Objective    BP (!) 153/77   Pulse 85   Resp 18   Ht 1.88 m (6' 2\")   Wt 91 kg (200 lb 9.6 oz)   SpO2 95%   BMI 25.76 kg/m    Body mass index is 25.76 kg/m .  Physical Exam  Constitutional:       General: He is not in acute distress.     Appearance: Normal appearance. He is normal weight. He is not ill-appearing, toxic-appearing or diaphoretic.   Cardiovascular:      Rate and Rhythm: Regular rhythm.      Heart sounds: Normal heart sounds.   Pulmonary:      Breath sounds: Normal breath sounds.   Musculoskeletal:      Cervical back: No rigidity or tenderness.      Right lower leg: No edema.      Left lower leg: No edema.      Comments: Rotator cuff testing reveals mild weakness of infraspinatus on the right.  The right upper arm reveals no swelling or mass.  Minimal " tenderness on deep palpation over the posterolateral portion of the humeral head.   Lymphadenopathy:      Cervical: No cervical adenopathy.   Neurological:      Mental Status: He is alert.            Lab on 09/26/2023   Component Date Value Ref Range Status    Hemoglobin A1C 09/26/2023 8.2 (H)  0.0 - 5.6 % Final    Normal <5.7%   Prediabetes 5.7-6.4%    Diabetes 6.5% or higher     Note: Adopted from ADA consensus guidelines.

## 2023-09-27 NOTE — PATIENT INSTRUCTIONS
Take naproxen twice a day for 10 days for R arm pain.    Do strengthening exercises on your arm 2-3 times a day for a couple of weeks.

## 2023-10-02 NOTE — PROGRESS NOTES
Medication Therapy Management (MTM) Encounter    ASSESSMENT:                            Medication Adherence/Access: No issues identified    Diabetes: Not at goal  Patient is not meeting A1c goal of < 7%. Self monitoring of blood glucose is not at goal of fasting  mg/dL.  Recommend increasing insulin to improve glycemic control.       PLAN:                            Increase lantus to 25 units once daily    Follow-up: with PCP as needed     SUBJECTIVE/OBJECTIVE:                          Karlo Townsend is a 68 year old male coming in for an initial visit. He was referred to me from Dr. Nicola Daigle. Patient was accompanied by his caregiver (Arnel).     Reason for visit: MTM .    Allergies/ADRs: Reviewed in chart  Past Medical History: Reviewed in chart  Tobacco: He reports that he has been smoking cigarettes. He has a 20.00 pack-year smoking history. He has never used smokeless tobacco.Nicotine/Tobacco Cessation Plan:   Information offered: Patient not interested at this time    Alcohol: not assessed today  Social: lives in assisted living facility, blood sugars and blood pressures sent via bladimir to son's phone  Medication Adherence/Access: no issues reported; assistance provided by facility staff    Diabetes    Type 2 Diabetes:    Lantus 22 units once daily  Fingerstick glucometer and testing supplies    Patient is not experiencing side effects.  Blood sugar monitorin time(s) daily; Ranges: (from patient's glucose log) Fasting- 126 to 187   Date Fasting/Pre-Prandial    151    135    132    154    130    126    152   /20 159   /19 164   /18 187   /17 159   /16 164   15 187     Current diabetes symptoms: none  Diet/Exercise: eats meals provided by facility; exercise not assessed today      Eye exam in the last 12 months? No    Foot exam: due  Urine Albumin:   Lab Results   Component Value Date    UMALCR 15.48 2023      Lab Results   Component Value Date    A1C 8.2 (H)  09/26/2023    A1C 7.3 (H) 11/29/2022    A1C 7.0 (H) 05/25/2022       Today's Vitals: There were no vitals taken for this visit.  ----------------      I spent 20 minutes with this patient today. All changes were made via collaborative practice agreement with Dr. Nicola Daigle. A copy of the visit note was provided to the patient's provider(s).    A summary of these recommendations was given to the patient (see AVS from today's appointment with Dr. Daigle).    Alicia Lyon, PharmD, BCACP     Medication Therapy Recommendations  Type 2 diabetes mellitus without complication, with long-term current use of insulin (H)    Current Medication: insulin glargine (LANTUS SOLOSTAR) 100 UNIT/ML pen   Rationale: Dose too low - Dosage too low - Effectiveness   Recommendation: Increase Dose   Status: Accepted per CPA

## 2023-11-08 ENCOUNTER — IMMUNIZATION (OUTPATIENT)
Dept: FAMILY MEDICINE | Facility: CLINIC | Age: 68
End: 2023-11-08
Payer: MEDICARE

## 2023-11-08 DIAGNOSIS — Z23 NEED FOR PROPHYLACTIC VACCINATION AND INOCULATION AGAINST INFLUENZA: Primary | ICD-10-CM

## 2023-11-08 DIAGNOSIS — Z23 HIGH PRIORITY FOR 2019-NCOV VACCINE: ICD-10-CM

## 2023-11-08 PROCEDURE — 91320 SARSCV2 VAC 30MCG TRS-SUC IM: CPT

## 2023-11-08 PROCEDURE — 90480 ADMN SARSCOV2 VAC 1/ONLY CMP: CPT

## 2023-11-17 ENCOUNTER — MYC MEDICAL ADVICE (OUTPATIENT)
Dept: FAMILY MEDICINE | Facility: CLINIC | Age: 68
End: 2023-11-17
Payer: MEDICARE

## 2023-11-17 DIAGNOSIS — E11.9 TYPE 2 DIABETES MELLITUS WITHOUT COMPLICATION, WITH LONG-TERM CURRENT USE OF INSULIN (H): Primary | ICD-10-CM

## 2023-11-17 DIAGNOSIS — Z79.4 TYPE 2 DIABETES MELLITUS WITHOUT COMPLICATION, WITH LONG-TERM CURRENT USE OF INSULIN (H): Primary | ICD-10-CM

## 2023-12-02 DIAGNOSIS — E11.9 TYPE 2 DIABETES MELLITUS WITHOUT COMPLICATION, WITH LONG-TERM CURRENT USE OF INSULIN (H): ICD-10-CM

## 2023-12-02 DIAGNOSIS — Z79.4 TYPE 2 DIABETES MELLITUS WITHOUT COMPLICATION, WITH LONG-TERM CURRENT USE OF INSULIN (H): ICD-10-CM

## 2023-12-04 RX ORDER — PEN NEEDLE, DIABETIC 29 G X1/2"
NEEDLE, DISPOSABLE MISCELLANEOUS
Qty: 100 EACH | Refills: 0 | Status: SHIPPED | OUTPATIENT
Start: 2023-12-04 | End: 2024-02-26

## 2024-01-28 ENCOUNTER — HEALTH MAINTENANCE LETTER (OUTPATIENT)
Age: 69
End: 2024-01-28

## 2024-02-01 ENCOUNTER — DOCUMENTATION ONLY (OUTPATIENT)
Dept: FAMILY MEDICINE | Facility: CLINIC | Age: 69
End: 2024-02-01
Payer: COMMERCIAL

## 2024-02-01 NOTE — PROGRESS NOTES
"When opening a documentation only encounter, be sure to enter in \"Chief Complaint\" Forms and in \" Comments\" Title of form, description if needed.    Karlo is a 68 year old  male  Form received via: Fax  Form now resides in: Provider Raúl Resendez MA               "

## 2024-02-26 DIAGNOSIS — Z79.4 TYPE 2 DIABETES MELLITUS WITHOUT COMPLICATION, WITH LONG-TERM CURRENT USE OF INSULIN (H): ICD-10-CM

## 2024-02-26 DIAGNOSIS — E11.9 TYPE 2 DIABETES MELLITUS WITHOUT COMPLICATION, WITH LONG-TERM CURRENT USE OF INSULIN (H): ICD-10-CM

## 2024-02-27 RX ORDER — PEN NEEDLE, DIABETIC 29 G X1/2"
NEEDLE, DISPOSABLE MISCELLANEOUS
Qty: 30 EACH | Refills: 0 | Status: SHIPPED | OUTPATIENT
Start: 2024-02-27 | End: 2024-05-29

## 2024-02-28 ENCOUNTER — MYC MEDICAL ADVICE (OUTPATIENT)
Dept: FAMILY MEDICINE | Facility: CLINIC | Age: 69
End: 2024-02-28
Payer: COMMERCIAL

## 2024-02-28 ENCOUNTER — TELEPHONE (OUTPATIENT)
Dept: FAMILY MEDICINE | Facility: CLINIC | Age: 69
End: 2024-02-28
Payer: COMMERCIAL

## 2024-02-28 NOTE — TELEPHONE ENCOUNTER
Pharmacy calling regarding pen needles  Filled for quantity of 30 but only comes in a box of 100  Verbal approval given to change quantity to 100 so order can be fulfilled    ANGELICA Mcgee, BSN, RN  [facility]

## 2024-02-29 ENCOUNTER — DOCUMENTATION ONLY (OUTPATIENT)
Dept: FAMILY MEDICINE | Facility: CLINIC | Age: 69
End: 2024-02-29
Payer: COMMERCIAL

## 2024-02-29 NOTE — PROGRESS NOTES
"When opening a documentation only encounter, be sure to enter in \"Chief Complaint\" Forms and in \" Comments\" Title of form, description if needed.    Karlo is a 68 year old  male  Form received via: Fax  Form now resides in: Provider Ready    Lilly Beauchamp    Form has been completed by provider.     Form sent out via: Fax to [facility]  at Fax Number: [phone]  Patient informed: No, Reason:  Output date: March 7, 2024    Lilly Beauchamp      **Please close the encounter**              "

## 2024-03-25 DIAGNOSIS — Z79.4 TYPE 2 DIABETES MELLITUS WITHOUT COMPLICATION, WITH LONG-TERM CURRENT USE OF INSULIN (H): ICD-10-CM

## 2024-03-25 DIAGNOSIS — E11.9 TYPE 2 DIABETES MELLITUS WITHOUT COMPLICATION, WITH LONG-TERM CURRENT USE OF INSULIN (H): ICD-10-CM

## 2024-03-25 DIAGNOSIS — I10 ESSENTIAL HYPERTENSION: ICD-10-CM

## 2024-03-25 RX ORDER — AMLODIPINE BESYLATE 10 MG/1
10 TABLET ORAL EVERY MORNING
Qty: 30 TABLET | Refills: 11 | Status: SHIPPED | OUTPATIENT
Start: 2024-03-25

## 2024-03-25 RX ORDER — ATORVASTATIN CALCIUM 40 MG/1
TABLET, FILM COATED ORAL
Qty: 30 TABLET | Refills: 11 | Status: SHIPPED | OUTPATIENT
Start: 2024-03-25

## 2024-03-25 NOTE — TELEPHONE ENCOUNTER

## 2024-04-19 DIAGNOSIS — E11.9 TYPE 2 DIABETES MELLITUS WITHOUT COMPLICATION, WITH LONG-TERM CURRENT USE OF INSULIN (H): ICD-10-CM

## 2024-04-19 DIAGNOSIS — Z79.4 TYPE 2 DIABETES MELLITUS WITHOUT COMPLICATION, WITH LONG-TERM CURRENT USE OF INSULIN (H): ICD-10-CM

## 2024-04-22 ENCOUNTER — MYC MEDICAL ADVICE (OUTPATIENT)
Dept: FAMILY MEDICINE | Facility: CLINIC | Age: 69
End: 2024-04-22

## 2024-04-22 ENCOUNTER — OFFICE VISIT (OUTPATIENT)
Dept: FAMILY MEDICINE | Facility: CLINIC | Age: 69
End: 2024-04-22
Payer: MEDICARE

## 2024-04-22 VITALS
RESPIRATION RATE: 18 BRPM | BODY MASS INDEX: 26.85 KG/M2 | DIASTOLIC BLOOD PRESSURE: 73 MMHG | WEIGHT: 202.6 LBS | SYSTOLIC BLOOD PRESSURE: 149 MMHG | HEIGHT: 73 IN | HEART RATE: 79 BPM | TEMPERATURE: 98 F | OXYGEN SATURATION: 95 %

## 2024-04-22 DIAGNOSIS — E11.9 TYPE 2 DIABETES MELLITUS WITHOUT COMPLICATION, WITH LONG-TERM CURRENT USE OF INSULIN (H): Primary | ICD-10-CM

## 2024-04-22 DIAGNOSIS — Z79.4 TYPE 2 DIABETES MELLITUS WITHOUT COMPLICATION, WITH LONG-TERM CURRENT USE OF INSULIN (H): Primary | ICD-10-CM

## 2024-04-22 DIAGNOSIS — I10 ESSENTIAL HYPERTENSION: ICD-10-CM

## 2024-04-22 LAB
ERYTHROCYTE [DISTWIDTH] IN BLOOD BY AUTOMATED COUNT: 12.7 % (ref 10–15)
HBA1C MFR BLD: 8.7 % (ref 0–5.6)
HCT VFR BLD AUTO: 41.8 % (ref 40–53)
HGB BLD-MCNC: 13.7 G/DL (ref 13.3–17.7)
MCH RBC QN AUTO: 28.5 PG (ref 26.5–33)
MCHC RBC AUTO-ENTMCNC: 32.8 G/DL (ref 31.5–36.5)
MCV RBC AUTO: 87 FL (ref 78–100)
PLATELET # BLD AUTO: 136 10E3/UL (ref 150–450)
RBC # BLD AUTO: 4.81 10E6/UL (ref 4.4–5.9)
WBC # BLD AUTO: 5.3 10E3/UL (ref 4–11)

## 2024-04-22 PROCEDURE — 80061 LIPID PANEL: CPT | Performed by: FAMILY MEDICINE

## 2024-04-22 PROCEDURE — 84443 ASSAY THYROID STIM HORMONE: CPT | Performed by: FAMILY MEDICINE

## 2024-04-22 PROCEDURE — 99214 OFFICE O/P EST MOD 30 MIN: CPT | Performed by: FAMILY MEDICINE

## 2024-04-22 PROCEDURE — 82607 VITAMIN B-12: CPT | Performed by: FAMILY MEDICINE

## 2024-04-22 PROCEDURE — 83036 HEMOGLOBIN GLYCOSYLATED A1C: CPT | Performed by: FAMILY MEDICINE

## 2024-04-22 PROCEDURE — 36415 COLL VENOUS BLD VENIPUNCTURE: CPT | Performed by: FAMILY MEDICINE

## 2024-04-22 PROCEDURE — 85027 COMPLETE CBC AUTOMATED: CPT | Performed by: FAMILY MEDICINE

## 2024-04-22 PROCEDURE — 80053 COMPREHEN METABOLIC PANEL: CPT | Performed by: FAMILY MEDICINE

## 2024-04-22 RX ORDER — LANCETS
EACH MISCELLANEOUS
Qty: 100 EACH | Refills: 3 | Status: SHIPPED | OUTPATIENT
Start: 2024-04-22 | End: 2024-07-10

## 2024-04-22 RX ORDER — INSULIN GLARGINE 100 [IU]/ML
INJECTION, SOLUTION SUBCUTANEOUS
Qty: 30 ML | Refills: 0 | Status: SHIPPED | OUTPATIENT
Start: 2024-04-22 | End: 2024-07-10

## 2024-04-22 RX ORDER — BLOOD SUGAR DIAGNOSTIC
STRIP MISCELLANEOUS
Qty: 200 STRIP | Refills: 3 | Status: SHIPPED | OUTPATIENT
Start: 2024-04-22 | End: 2024-07-10

## 2024-04-22 RX ORDER — BLOOD SUGAR DIAGNOSTIC
STRIP MISCELLANEOUS
Refills: 0 | OUTPATIENT
Start: 2024-04-22

## 2024-04-22 RX ORDER — RESPIRATORY SYNCYTIAL VIRUS VACCINE 120MCG/0.5
0.5 KIT INTRAMUSCULAR ONCE
Qty: 1 EACH | Refills: 0 | Status: CANCELLED | OUTPATIENT
Start: 2024-04-22 | End: 2024-04-22

## 2024-04-22 ASSESSMENT — PAIN SCALES - GENERAL: PAINLEVEL: NO PAIN (0)

## 2024-04-22 NOTE — PATIENT INSTRUCTIONS
Increase lantus to 28 units per day.   Check blood sugar fasting for next 2 weeks - if average is above 140 - increase lantus to 32 units per day.     Follow up in 3 months. Continue to check bp at facility. We will adjust bp medication if needed. No changes today.     Consider RSV vaccine - get it through pharmacy.     Consider eye exam.     =======================================  FYI:     System will autorelease results as soon as they are available. I usually wait for all results to be ready before sending you a comment or message.  Be assured I will review and comment on all of your results as soon as I can.     I am at [facility]  ([phone]) usually Monday, Tuesday and Wednesday.   Messages, evisits, phone calls received on Thursday and Friday may not get responded very urgently but I will try to respond as soon as possible.     2) My schedule sometime been booking out far in advance. I apologize for the lack of timely access. If you need to be seen for a chronic condition or preventive (wellness) visit, please be sure to schedule that appointment few months in advance.  If you have a concern that you feel cannot wait until my next available appointment (such as a hospital follow-up or new symptom of concern) please ask to speak to one of the [facility] nurses who may be able to access a sooner appointment.    You can schedule a video visit for follow-up appointments as well as future appointments for certain conditions.  Please see the below link.     Video Visits ([email or web address])     If you have not already done so,  I encourage you to sign up for [facility] ([email or web address]).  This will allow you to review your results, securely communicate with a provider, and schedule virtual visits as well.

## 2024-04-22 NOTE — TELEPHONE ENCOUNTER
Rx denied. Needs appt before further refill. Please notify patient and help them to schedule an appointment.

## 2024-04-22 NOTE — PROGRESS NOTES
"  Assessment & Plan     Type 2 diabetes mellitus without complication, with long-term current use of insulin (H)  A1c is worsening.  We agreed to increase the dose of insulin. Written instructions given to increase by 3 units and again by 3 units in few weeks pending his fasting glucose reading. Patient and staff understood.   - Hemoglobin A1c; Future  - Lipid panel reflex to direct LDL Fasting; Future  - Comprehensive metabolic panel; Future  - Albumin Random Urine Quantitative with Creat Ratio; Future  - CBC with platelets; Future  - Vitamin B12; Future  - TSH with free T4 reflex; Future  - Hemoglobin A1c  - Lipid panel reflex to direct LDL Fasting  - Comprehensive metabolic panel  - CBC with platelets  - Vitamin B12  - TSH with free T4 reflex  - blood glucose (ACCU-CHEK VANIA PLUS) test strip; TEST TWICE DAILY  - insulin glargine (LANTUS SOLOSTAR) 100 UNIT/ML pen; INJECT 28 UNITS SUBCUTANEOUSLY ONCE DAILY *DISCARD PEN 28 DAYS AFTER FIRST USE, DO NOT REFRIGERATE AFTER FIRST USE*  - thin (NO BRAND SPECIFIED) lancets; Use to test blood sugar 2 times daily or as directed. To accompany: Blood Glucose Monitor Brands: per insurance.  - Adult Eye  Referral; Future  - Albumin Random Urine Quantitative with Creat Ratio; Future    Essential hypertension   Bp above goal. His facility bp readings are excellent and we agreed not to adjust blood pressure medication today.  He is coming in for office visit in few months and we will see if blood pressure remains high we need to adjust his blood pressure medication.            BMI  Estimated body mass index is 27.03 kg/m  as calculated from the following:    Height as of this encounter: 1.844 m (6' 0.6\").    Weight as of this encounter: 91.9 kg (202 lb 9.6 oz).         Follow-up in 3 months    Natalia Dietrich is a 68 year old, presenting for the following health issues:  Recheck Medication        4/22/2024     1:55 PM   Additional Questions   Roomed by Nuzhat DE LA CRUZ" "  Accompanied by Aid worker     History of Present Illness       Diabetes:   He presents for follow up of diabetes.  He is checking home blood glucose one time daily.   He checks blood glucose before meals.  Blood glucose is sometimes over 200 and never under 70.     He has no concerns regarding his diabetes at this time.  He is having numbness in feet.  The patient has not had a diabetic eye exam in the last 12 months.          He eats 2-3 servings of fruits and vegetables daily.He consumes 0 sweetened beverage(s) daily.He exercises with enough effort to increase his heart rate 10 to 19 minutes per day.  He exercises with enough effort to increase his heart rate 7 days per week.   He is taking medications regularly.     Lately blood sugars are higher at home.  Fasting glucose in 140-190s. 205 this morning.   Using 25 units of lantus daily.   Facility checks bp twice per week - 120-130/70 average. Has records with him.   Has cologuard kit at home.     No new episode of seizures.     Here with group home staff.           Objective    BP (!) 149/73   Pulse 79   Temp 98  F (36.7  C) (Temporal)   Resp 18   Ht 1.844 m (6' 0.6\")   Wt 91.9 kg (202 lb 9.6 oz)   SpO2 95%   BMI 27.03 kg/m    Body mass index is 27.03 kg/m .  Physical Exam               Signed Electronically by: Reji Nguyen MD    "

## 2024-04-23 LAB
ALBUMIN SERPL BCG-MCNC: 4.5 G/DL (ref 3.5–5.2)
ALP SERPL-CCNC: 175 U/L (ref 40–150)
ALT SERPL W P-5'-P-CCNC: 40 U/L (ref 0–70)
ANION GAP SERPL CALCULATED.3IONS-SCNC: 10 MMOL/L (ref 7–15)
AST SERPL W P-5'-P-CCNC: 33 U/L (ref 0–45)
BILIRUB SERPL-MCNC: 0.4 MG/DL
BUN SERPL-MCNC: 14.3 MG/DL (ref 8–23)
CALCIUM SERPL-MCNC: 9.6 MG/DL (ref 8.8–10.2)
CHLORIDE SERPL-SCNC: 104 MMOL/L (ref 98–107)
CHOLEST SERPL-MCNC: 105 MG/DL
CREAT SERPL-MCNC: 1.03 MG/DL (ref 0.67–1.17)
DEPRECATED HCO3 PLAS-SCNC: 26 MMOL/L (ref 22–29)
EGFRCR SERPLBLD CKD-EPI 2021: 80 ML/MIN/1.73M2
FASTING STATUS PATIENT QL REPORTED: NO
GLUCOSE SERPL-MCNC: 235 MG/DL (ref 70–99)
HDLC SERPL-MCNC: 20 MG/DL
LDLC SERPL CALC-MCNC: 43 MG/DL
NONHDLC SERPL-MCNC: 85 MG/DL
POTASSIUM SERPL-SCNC: 4.7 MMOL/L (ref 3.4–5.3)
PROT SERPL-MCNC: 7.2 G/DL (ref 6.4–8.3)
SODIUM SERPL-SCNC: 140 MMOL/L (ref 135–145)
TRIGL SERPL-MCNC: 209 MG/DL
TSH SERPL DL<=0.005 MIU/L-ACNC: 2.69 UIU/ML (ref 0.3–4.2)
VIT B12 SERPL-MCNC: 542 PG/ML (ref 232–1245)

## 2024-05-29 DIAGNOSIS — E11.9 TYPE 2 DIABETES MELLITUS WITHOUT COMPLICATION, WITH LONG-TERM CURRENT USE OF INSULIN (H): ICD-10-CM

## 2024-05-29 DIAGNOSIS — Z79.4 TYPE 2 DIABETES MELLITUS WITHOUT COMPLICATION, WITH LONG-TERM CURRENT USE OF INSULIN (H): ICD-10-CM

## 2024-05-29 RX ORDER — PEN NEEDLE, DIABETIC 29 G X1/2"
NEEDLE, DISPOSABLE MISCELLANEOUS
Qty: 100 EACH | Refills: 2 | Status: SHIPPED | OUTPATIENT
Start: 2024-05-29 | End: 2024-09-03

## 2024-07-08 SDOH — HEALTH STABILITY: PHYSICAL HEALTH: ON AVERAGE, HOW MANY DAYS PER WEEK DO YOU ENGAGE IN MODERATE TO STRENUOUS EXERCISE (LIKE A BRISK WALK)?: 2 DAYS

## 2024-07-08 SDOH — HEALTH STABILITY: PHYSICAL HEALTH: ON AVERAGE, HOW MANY MINUTES DO YOU ENGAGE IN EXERCISE AT THIS LEVEL?: 30 MIN

## 2024-07-08 ASSESSMENT — SOCIAL DETERMINANTS OF HEALTH (SDOH): HOW OFTEN DO YOU GET TOGETHER WITH FRIENDS OR RELATIVES?: PATIENT DECLINED

## 2024-07-10 ENCOUNTER — OFFICE VISIT (OUTPATIENT)
Dept: FAMILY MEDICINE | Facility: CLINIC | Age: 69
End: 2024-07-10
Payer: MEDICARE

## 2024-07-10 ENCOUNTER — TELEPHONE (OUTPATIENT)
Dept: FAMILY MEDICINE | Facility: CLINIC | Age: 69
End: 2024-07-10

## 2024-07-10 VITALS
RESPIRATION RATE: 16 BRPM | BODY MASS INDEX: 25.51 KG/M2 | HEART RATE: 76 BPM | OXYGEN SATURATION: 98 % | SYSTOLIC BLOOD PRESSURE: 128 MMHG | DIASTOLIC BLOOD PRESSURE: 56 MMHG | HEIGHT: 73 IN | WEIGHT: 192.5 LBS | TEMPERATURE: 97.7 F

## 2024-07-10 DIAGNOSIS — Z79.4 TYPE 2 DIABETES MELLITUS WITHOUT COMPLICATION, WITH LONG-TERM CURRENT USE OF INSULIN (H): ICD-10-CM

## 2024-07-10 DIAGNOSIS — Z87.891 PERSONAL HISTORY OF TOBACCO USE: ICD-10-CM

## 2024-07-10 DIAGNOSIS — E11.9 TYPE 2 DIABETES MELLITUS WITHOUT COMPLICATION, WITH LONG-TERM CURRENT USE OF INSULIN (H): ICD-10-CM

## 2024-07-10 DIAGNOSIS — Z00.00 ENCOUNTER FOR MEDICARE ANNUAL WELLNESS EXAM: Primary | ICD-10-CM

## 2024-07-10 LAB
ERYTHROCYTE [DISTWIDTH] IN BLOOD BY AUTOMATED COUNT: 13.1 % (ref 10–15)
HBA1C MFR BLD: 6.8 % (ref 0–5.6)
HCT VFR BLD AUTO: 43 % (ref 40–53)
HGB BLD-MCNC: 13.9 G/DL (ref 13.3–17.7)
MCH RBC QN AUTO: 28.7 PG (ref 26.5–33)
MCHC RBC AUTO-ENTMCNC: 32.3 G/DL (ref 31.5–36.5)
MCV RBC AUTO: 89 FL (ref 78–100)
PLATELET # BLD AUTO: 136 10E3/UL (ref 150–450)
RBC # BLD AUTO: 4.85 10E6/UL (ref 4.4–5.9)
WBC # BLD AUTO: 6.1 10E3/UL (ref 4–11)

## 2024-07-10 PROCEDURE — G0438 PPPS, INITIAL VISIT: HCPCS | Performed by: FAMILY MEDICINE

## 2024-07-10 PROCEDURE — 99214 OFFICE O/P EST MOD 30 MIN: CPT | Mod: 25 | Performed by: FAMILY MEDICINE

## 2024-07-10 PROCEDURE — 83036 HEMOGLOBIN GLYCOSYLATED A1C: CPT | Performed by: FAMILY MEDICINE

## 2024-07-10 PROCEDURE — 36415 COLL VENOUS BLD VENIPUNCTURE: CPT | Performed by: FAMILY MEDICINE

## 2024-07-10 PROCEDURE — 85027 COMPLETE CBC AUTOMATED: CPT | Performed by: FAMILY MEDICINE

## 2024-07-10 PROCEDURE — G0296 VISIT TO DETERM LDCT ELIG: HCPCS | Performed by: FAMILY MEDICINE

## 2024-07-10 RX ORDER — INSULIN GLARGINE 100 [IU]/ML
INJECTION, SOLUTION SUBCUTANEOUS
Qty: 30 ML | Refills: 1 | Status: SHIPPED | OUTPATIENT
Start: 2024-07-10

## 2024-07-10 RX ORDER — LANCETS
EACH MISCELLANEOUS
Qty: 200 EACH | Refills: 3 | Status: SHIPPED | OUTPATIENT
Start: 2024-07-10

## 2024-07-10 RX ORDER — BLOOD SUGAR DIAGNOSTIC
STRIP MISCELLANEOUS
Qty: 200 STRIP | Refills: 3 | Status: SHIPPED | OUTPATIENT
Start: 2024-07-10 | End: 2024-07-10

## 2024-07-10 ASSESSMENT — PATIENT HEALTH QUESTIONNAIRE - PHQ9
10. IF YOU CHECKED OFF ANY PROBLEMS, HOW DIFFICULT HAVE THESE PROBLEMS MADE IT FOR YOU TO DO YOUR WORK, TAKE CARE OF THINGS AT HOME, OR GET ALONG WITH OTHER PEOPLE: NOT DIFFICULT AT ALL
SUM OF ALL RESPONSES TO PHQ QUESTIONS 1-9: 0
SUM OF ALL RESPONSES TO PHQ QUESTIONS 1-9: 0

## 2024-07-10 ASSESSMENT — PAIN SCALES - GENERAL: PAINLEVEL: MODERATE PAIN (5)

## 2024-07-10 NOTE — TELEPHONE ENCOUNTER
Medication Question     Contacts       Contact Date/Time Type Contact Phone/Fax    07/10/2024 11:59 AM CDT Phone (Incoming) [facility] - [place], [place] - [address] (Pharmacy) [phone]    07/10/2024 12:37 PM CDT Phone (Incoming) [facility] - [place], [place] - [address] (Pharmacy) [phone]        What medication are you calling about (include dose and sig)?:     blood glucose (ACCU-CHEK VANIA PLUS) test strip 200 strip 3 7/10/2024 -- No   Sig: TEST TWICE DAILY       Disp Refills Start End JOSÉ MIGUEL    blood glucose (NO BRAND SPECIFIED) test strip 100 strip 6 7/10/2024 -- No   Sig: Use to test blood sugar 1 times daily or as directed.     Preferred Pharmacy:[facility] - [facility] [address]  [address]  [facility] [address]  Phone: [phone] Fax: [phone]    Who prescribed the medication?: Dr Nguyen    Do you have any questions or concerns?  Yes: Need to clarify directions once a day or BID for testing?    Also [facility] sent a fax over for Medicare Part B Compliance RX  that needs to be competed prior to dispensing Rx  Fax form back to [facility] at [phone]    Message routed to Dr Nguyen for clarification oorder.    Sara Gasca RN  [facility]

## 2024-07-10 NOTE — TELEPHONE ENCOUNTER
"[facility] pharmacist calling regarding BG test strip order  Accucheck donovan test strips ordered but pt has an Accucheck guidemeter  Generic \"no brand specified\" order pended below for review and signing - pharmacy confirms they can fill this with the appropriate test strips for pt. Pharmacy asks this be sent high priority so they can fill today.    ANGELICA Mcgee, BSN, RN (she/her)  [facility] RN    "

## 2024-07-10 NOTE — PROGRESS NOTES
"Preventive Care Visit  [facility]  Reji Beulah Nguyen MD, MD, Family Medicine  Jul 10, 2024      Assessment & Plan     Encounter for Medicare annual wellness exam       Type 2 diabetes mellitus without complication, with long-term current use of insulin (H)  Patient is tolerating current medication without any major side effects of concerns and current dose seems reasonable too.  Current medication regime is effective. Continue current treatment without any changes.   - Hemoglobin A1c; Future  - CBC with platelets; Future  - thin (NO BRAND SPECIFIED) lancets; Use to test blood sugar 2 times daily or as directed. To accompany: Blood Glucose Monitor Brands: per insurance.  - insulin glargine (LANTUS SOLOSTAR) 100 UNIT/ML pen; INJECT 28 UNITS SUBCUTANEOUSLY ONCE DAILY *DISCARD PEN 28 DAYS AFTER FIRST USE, DO NOT REFRIGERATE AFTER FIRST USE*  - Hemoglobin A1c  - CBC with platelets    Personal history of tobacco use  Ongoing.  Not interested in quitting.  Qualifies for lung cancer screening.  - Prof fee: Shared Decision Making for Lung Cancer Screening  - CT Chest Lung Cancer Scrn Low Dose wo; Future            BMI  Estimated body mass index is 25.75 kg/m  as calculated from the following:    Height as of this encounter: 1.842 m (6' 0.5\").    Weight as of this encounter: 87.3 kg (192 lb 8 oz).       Counseling  Appropriate preventive services were discussed with this patient, including applicable screening as appropriate for fall prevention, nutrition, physical activity, Tobacco-use cessation, weight loss and cognition.  Checklist reviewing preventive services available has been given to the patient.  Reviewed patient's diet, addressing concerns and/or questions.   He is at risk for lack of exercise and has been provided with information to increase physical activity for the benefit of his well-being.   The patient was instructed to see the dentist every 6 months.       Follow-up in 6 " months.    Subjective   Karlo is a 68 year old, presenting for the following:  Recheck Medication and Diabetes        7/10/2024     8:47 AM   Additional Questions   Roomed by Nuzhat DE LA CRUZ   Accompanied by          7/10/2024   Forms   Any forms needing to be completed Yes            Health Care Directive  Patient does not have a Health Care Directive or Living Will: Discussed advance care planning with patient; information given to patient to review.    HPI    Some hand and feet numbness.   Not all the time. Off and on.   Walking, biking and working in the garden.     Checking blood sugars daily. Generally 115. Yesterday lasagna and today high blood sugar.   Has log of his vitals with staff.     Lantus 28 units.     Has cologuard kit.     Smoking 1 pack per day.     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 Seeing neurologist yearly at [facility].               7/8/2024   General Health   How would you rate your overall physical health? Good   Feel stress (tense, anxious, or unable to sleep) Not at all            7/8/2024   Nutrition   Diet: Diabetic            7/8/2024   Exercise   Days per week of moderate/strenous exercise 2 days   Average minutes spent exercising at this level 30 min      (!) EXERCISE CONCERN      7/8/2024   Social Factors   Frequency of gathering with friends or relatives Patient declined   Worry food won't last until get money to buy more No   Food not last or not have enough money for food? No   Do you have housing? (Housing is defined as stable permanent housing and does not include staying ouside in a car, in a tent, in an abandoned building, in an overnight shelter, or couch-surfing.) Yes   Are you worried about losing your housing? No   Lack of transportation? No   Unable to get utilities (heat,electricity)? No            7/8/2024   Fall Risk   Fallen 2 or more times in the past year? No   Trouble with walking or balance? No             7/8/2024   Activities of Daily  Living- Home Safety   Needs help with the following daily activites None of the above   Safety concerns in the home None of the above            7/8/2024   Dental   Dentist two times every year? (!) NO            7/8/2024   Hearing Screening   Hearing concerns? None of the above            7/8/2024   Driving Risk Screening   Patient/family members have concerns about driving No            7/8/2024   General Alertness/Fatigue Screening   Have you been more tired than usual lately? No            7/8/2024   Urinary Incontinence Screening   Bothered by leaking urine in past 6 months No            7/8/2024   TB Screening   Were you born outside of the [place]? No          Today's PHQ-9 Score:       7/10/2024     8:38 AM   PHQ-9 SCORE   PHQ-9 Total Score MyChart 0   PHQ-9 Total Score 0         7/8/2024   Substance Use   If I could quit smoking, I would Completely disagree   I want to quit somking, worry about health affects Completely disagree   Willing to make a plan to quit smoking Completely disagree   Willing to cut down before quitting Completely disagree   Alcohol more than 3/day or more than 7/wk No   Do you have a current opioid prescription? No   How severe/bad is pain from 1 to 10? 1/10   Do you use any other substances recreationally? No        Social History     Tobacco Use    Smoking status: Every Day     Current packs/day: 0.50     Average packs/day: 0.5 packs/day for 40.0 years (20.0 ttl pk-yrs)     Types: Cigarettes    Smokeless tobacco: Never   Vaping Use    Vaping status: Never Used   Substance Use Topics    Alcohol use: Not Currently    Drug use: Never       Last PSA:   PSA   Date Value Ref Range Status   02/19/2021 1.32 0 - 4 ug/L Final     Comment:     Assay Method:  Chemiluminescence using Siemens Vista analyzer     ASCVD Risk   The ASCVD Risk score (Sunil DK, et al., 2019) failed to calculate for the following reasons:    The valid total cholesterol range is 130 to 320 mg/dL            Reviewed  "and updated as needed this visit by Provider                      Current providers sharing in care for this patient include:  Patient Care Team:  Reji Nguyen MD as PCP - General (Family Medicine)  Tigre Alfred MD as MD (Ophthalmology)  Karlo Nice MD as MD (Pulmonary Disease)  Reji Nguyen MD as Assigned PCP  Alicia Lyon RPH as Pharmacist (Pharmacist)  Alicia Lyon RPH as Assigned [place] Pharmacist    The following health maintenance items are reviewed in Epic and correct as of today:  Health Maintenance   Topic Date Due    RSV VACCINE (Pregnancy & 60+) (1 - 1-dose 60+ series) Never done    COLORECTAL CANCER SCREENING  08/04/2022    AORTIC ANEURYSM SCREENING (SYSTEM ASSIGNED)  Never done    EYE EXAM  09/23/2022    DIABETIC FOOT EXAM  05/25/2023    MEDICARE ANNUAL WELLNESS VISIT  11/29/2023    LUNG CANCER SCREENING  03/07/2024    COVID-19 Vaccine (7 - 2023-24 season) 03/08/2024    A1C  07/22/2024    INFLUENZA VACCINE (1) 09/01/2024    NICOTINE/TOBACCO CESSATION COUNSELING Q 1 YR  09/27/2024    MICROALBUMIN  09/27/2024    BMP  04/22/2025    LIPID  04/22/2025    ANNUAL REVIEW OF HM ORDERS  04/22/2025    FALL RISK ASSESSMENT  07/10/2025    ADVANCE CARE PLANNING  11/30/2027    DTAP/TDAP/TD IMMUNIZATION (4 - Td or Tdap) 11/20/2030    HEPATITIS C SCREENING  Completed    PHQ-2 (once per calendar year)  Completed    Pneumococcal Vaccine: 65+ Years  Completed    ZOSTER IMMUNIZATION  Completed    IPV IMMUNIZATION  Aged Out    HPV IMMUNIZATION  Aged Out    MENINGITIS IMMUNIZATION  Aged Out    RSV MONOCLONAL ANTIBODY  Aged Out            Objective    Exam  /56   Pulse 76   Temp 97.7  F (36.5  C) (Temporal)   Resp 16   Ht 1.842 m (6' 0.5\")   Wt 87.3 kg (192 lb 8 oz)   SpO2 98%   BMI 25.75 kg/m     Estimated body mass index is 25.75 kg/m  as calculated from the following:    Height as of this encounter: 1.842 m (6' 0.5\").    Weight as of this encounter: " 87.3 kg (192 lb 8 oz).    Physical Exam          7/10/2024   Mini Cog   Clock Draw Score 0 Abnormal   3 Item Recall 2 objects recalled   Mini Cog Total Score 2                 Signed Electronically by: Reji Nguyen MD, MD    Answers submitted by the patient for this visit:  Patient Health Questionnaire (Submitted on 7/10/2024)  If you checked off any problems, how difficult have these problems made it for you to do your work, take care of things at home, or get along with other people?: Not difficult at all  PHQ9 TOTAL SCORE: 0    Lung Cancer Screening Shared Decision Making Visit     Karlo Townsend, a 68 year old male, is eligible for lung cancer screening    History   Smoking Status    Every Day    Types: Cigarettes   Smokeless Tobacco    Never       I have discussed with patient the risks and benefits of screening for lung cancer with low-dose CT.     The risks include:    radiation exposure: one low dose chest CT has as much ionizing radiation as about 15 chest x-rays, or 6 months of background radiation living in [place]      false positives: most findings/nodules are NOT cancer, but some might still require additional diagnostic evaluation, including biopsy    over-diagnosis: some slow growing cancers that might never have been clinically significant will be detected and treated unnecessarily     The benefit of early detection of lung cancer is contingent upon adherence to annual screening or more frequent follow up if indicated.     Furthermore, to benefit from screening, Karlo must be willing and able to undergo diagnostic procedures, if indicated. Although no specific guide is available for determining severity of comorbidities, it is reasonable to withhold screening in patients who have greater mortality risk from other diseases.     We did discuss that the best way to prevent lung cancer is to not smoke.    Some patients may value a numeric estimation of lung cancer risk when  evaluating if lung cancer screening is right for them, here is one calculator:    ShouldIScreen

## 2024-07-10 NOTE — PATIENT INSTRUCTIONS
Patient Education   Preventive Care Advice   This is general advice given by our system to help you stay healthy. However, your care team may have specific advice just for you. Please talk to your care team about your preventive care needs.  Nutrition  Eat 5 or more servings of fruits and vegetables each day.  Try wheat bread, brown rice and whole grain pasta (instead of white bread, rice, and pasta).  Get enough calcium and vitamin D. Check the label on foods and aim for 100% of the RDA (recommended daily allowance).  Lifestyle  Exercise at least 150 minutes each week  (30 minutes a day, 5 days a week).  Do muscle strengthening activities 2 days a week. These help control your weight and prevent disease.  No smoking.  Wear sunscreen to prevent skin cancer.  Have a dental exam and cleaning every 6 months.  Yearly exams  See your health care team every year to talk about:  Any changes in your health.  Any medicines your care team has prescribed.  Preventive care, family planning, and ways to prevent chronic diseases.  Shots (vaccines)   HPV shots (up to age 26), if you've never had them before.  Hepatitis B shots (up to age 59), if you've never had them before.  COVID-19 shot: Get this shot when it's due.  Flu shot: Get a flu shot every year.  Tetanus shot: Get a tetanus shot every 10 years.  Pneumococcal, hepatitis A, and RSV shots: Ask your care team if you need these based on your risk.  Shingles shot (for age 50 and up)  General health tests  Diabetes screening:  Starting at age 35, Get screened for diabetes at least every 3 years.  If you are younger than age 35, ask your care team if you should be screened for diabetes.  Cholesterol test: At age 39, start having a cholesterol test every 5 years, or more often if advised.  Bone density scan (DEXA): At age 50, ask your care team if you should have this scan for osteoporosis (brittle bones).  Hepatitis C: Get tested at least once in your life.  STIs (sexually  transmitted infections)  Before age 24: Ask your care team if you should be screened for STIs.  After age 24: Get screened for STIs if you're at risk. You are at risk for STIs (including HIV) if:  You are sexually active with more than one person.  You don't use condoms every time.  You or a partner was diagnosed with a sexually transmitted infection.  If you are at risk for HIV, ask about PrEP medicine to prevent HIV.  Get tested for HIV at least once in your life, whether you are at risk for HIV or not.  Cancer screening tests  Cervical cancer screening: If you have a cervix, begin getting regular cervical cancer screening tests starting at age 21.  Breast cancer scan (mammogram): If you've ever had breasts, begin having regular mammograms starting at age 40. This is a scan to check for breast cancer.  Colon cancer screening: It is important to start screening for colon cancer at age 45.  Have a colonoscopy test every 10 years (or more often if you're at risk) Or, ask your provider about stool tests like a FIT test every year or Cologuard test every 3 years.  To learn more about your testing options, visit:   .  For help making a decision, visit:   https://bit.ly/hf57589.  Prostate cancer screening test: If you have a prostate, ask your care team if a prostate cancer screening test (PSA) at age 55 is right for you.  Lung cancer screening: If you are a current or former smoker ages 50 to 80, ask your care team if ongoing lung cancer screenings are right for you.  For informational purposes only. Not to replace the advice of your health care provider. Copyright   2023 [facility]. All rights reserved. Clinically reviewed by the [facility]. Viki [id number] - REV 01/24.     Lung Cancer Screening   Frequently Asked Questions  If you are at high-risk for lung cancer, getting screened with low-dose computed tomography (LDCT) every year can help save your life. This handout offers  answers to some of the most common questions about lung cancer screening. If you have other questions, please call [phone] ([phone]).     What is it?  Lung cancer screening uses special X-ray technology to create an image of your lung tissue. The exam is quick and easy and takes less than 10 seconds. We don t give you any medicine or use any needles. You can eat before and after the exam. You don t need to change your clothes as long as the clothing on your chest doesn t contain metal. But, you do need to be able to hold your breath for at least 6 seconds during the exam.    What is the goal of lung cancer screening?  The goal of lung cancer screening is to save lives. Many times, lung cancer is not found until a person starts having physical symptoms. Lung cancer screening can help detect lung cancer in the earliest stages when it may be easier to treat.    Who should be screened for lung cancer?  We suggest lung cancer screening for anyone who is at high-risk for lung cancer. You are in the high-risk group if you:      are between the ages of 55 and 79, and    have smoked at least 1 pack of cigarettes a day for 20 or more years, and    still smoke or have quit within the past 15 years.    However, if you have a new cough or shortness of breath, you should talk to your doctor before being screened.    Why does it matter if I have symptoms?  Certain symptoms can be a sign that you have a condition in your lungs that should be checked and treated by your doctor. These symptoms include fever, chest pain, a new or changing cough, shortness of breath that you have never felt before, coughing up blood or unexplained weight loss. Having any of these symptoms can greatly affect the results of lung cancer screening.       Should all smokers get an LDCT lung cancer screening exam?  It depends. Lung cancer screening is for a very specific group of men and women who have a history of heavy smoking over a long  period of time (see  Who should be screened for lung cancer  above).  I am in the high-risk group, but have been diagnosed with cancer in the past. Is LDCT lung cancer screening right for me?  In some cases, you should not have LDCT lung screening, such as when your doctor is already following your cancer with CT scan studies. Your doctor will help you decide if LDCT lung screening is right for you.  Do I need to have a screening exam every year?  Yes. If you are in the high-risk group described earlier, you should get an LDCT lung cancer screening exam every year until you are 79, or are no longer willing or able to undergo screening and possible procedures to diagnose and treat lung cancer.  How effective is LDCT at preventing death from lung cancer?  Studies have shown that LDCT lung cancer screening can lower the risk of death from lung cancer by 20 percent in people who are at high-risk.  What are the risks?  There are some risks and limitations of LDCT lung cancer screening. We want to make sure you understand the risks and benefits, so please let us know if you have any questions. Your doctor may want to talk with you more about these risks.    Radiation exposure: As with any exam that uses radiation, there is a very small increased risk of cancer. The amount of radiation in LDCT is small--about the same amount a person would get from a mammogram. Your doctor orders the exam when he or she feels the potential benefits outweigh the risks.    False negatives: No test is perfect, including LDCT. It is possible that you may have a medical condition, including lung cancer, that is not found during your exam. This is called a false negative result.    False positives and more testing: LDCT very often finds something in the lung that could be cancer, but in fact is not. This is called a false positive result. False positive tests often cause anxiety. To make sure these findings are not cancer, you may need to have  more tests. These tests will be done only if you give us permission. Sometimes patients need a treatment that can have side effects, such as a biopsy. For more information on false positives, see  What can I expect from the results?     Findings not related to lung cancer: Your LDCT exam also takes pictures of areas of your body next to your lungs. In a very small number of cases, the CT scan will show an abnormal finding in one of these areas, such as your kidneys, adrenal glands, liver or thyroid. This finding may not be serious, but you may need more tests. Your doctor can help you decide what other tests you may need, if any.  What can I expect from the results?  About 1 out of 4 LDCT exams will find something that may need more tests. Most of the time, these findings are lung nodules. Lung nodules are very small collections of tissue in the lung. These nodules are very common, and the vast majority--more than 97 percent--are not cancer (benign). Most are normal lymph nodes or small areas of scarring from past infections.  But, if a small lung nodule is found to be cancer, the cancer can be cured more than 90 percent of the time. To know if the nodule is cancer, we may need to get more images before your next yearly screening exam. If the nodule has suspicious features (for example, it is large, has an odd shape or grows over time), we will refer you to a specialist for further testing.  Will my doctor also get the results?  Yes. Your doctor will get a copy of your results.  Is it okay to keep smoking now that there s a cancer screening exam?  No. Tobacco is one of the strongest cancer-causing agents. It causes not only lung cancer, but other cancers and cardiovascular (heart) diseases as well. The damage caused by smoking builds over time. This means that the longer you smoke, the higher your risk of disease. While it is never too late to quit, the sooner you quit, the better.  Where can I find help to quit  smoking?  The best way to prevent lung cancer is to stop smoking. If you have already quit smoking, congratulations and keep it up! For help on quitting smoking, please call [facility] at 7-789-QUITNOW (1-891.678.7829) or the [employer] at 1-765.158.2808 to find local resources near you.  One-on-one health coaching:  If you d prefer to work individually with a health care provider on tobacco cessation, we offer:      Medication Therapy Management:  Our specially trained pharmacists work closely with you and your doctor to help you quit smoking.  Call [phone] or [phone] (toll free).

## 2024-07-16 ENCOUNTER — MEDICAL CORRESPONDENCE (OUTPATIENT)
Dept: HEALTH INFORMATION MANAGEMENT | Facility: CLINIC | Age: 69
End: 2024-07-16
Payer: MEDICARE

## 2024-07-29 ENCOUNTER — PATIENT OUTREACH (OUTPATIENT)
Dept: CARE COORDINATION | Facility: CLINIC | Age: 69
End: 2024-07-29
Payer: COMMERCIAL

## 2024-08-02 ENCOUNTER — ANCILLARY PROCEDURE (OUTPATIENT)
Dept: CT IMAGING | Facility: CLINIC | Age: 69
End: 2024-08-02
Attending: FAMILY MEDICINE
Payer: MEDICARE

## 2024-08-02 DIAGNOSIS — Z87.891 PERSONAL HISTORY OF TOBACCO USE: ICD-10-CM

## 2024-08-02 PROCEDURE — 71271 CT THORAX LUNG CANCER SCR C-: CPT | Mod: GC | Performed by: RADIOLOGY

## 2024-09-03 ENCOUNTER — MYC REFILL (OUTPATIENT)
Dept: FAMILY MEDICINE | Facility: CLINIC | Age: 69
End: 2024-09-03
Payer: COMMERCIAL

## 2024-09-03 DIAGNOSIS — E11.9 TYPE 2 DIABETES MELLITUS WITHOUT COMPLICATION, WITH LONG-TERM CURRENT USE OF INSULIN (H): ICD-10-CM

## 2024-09-03 DIAGNOSIS — Z79.4 TYPE 2 DIABETES MELLITUS WITHOUT COMPLICATION, WITH LONG-TERM CURRENT USE OF INSULIN (H): ICD-10-CM

## 2024-09-03 RX ORDER — PEN NEEDLE, DIABETIC 29 G X1/2"
NEEDLE, DISPOSABLE MISCELLANEOUS
Qty: 100 EACH | Refills: 0 | OUTPATIENT
Start: 2024-09-03

## 2024-09-03 RX ORDER — PEN NEEDLE, DIABETIC 29 G X1/2"
NEEDLE, DISPOSABLE MISCELLANEOUS
Qty: 100 EACH | Refills: 2 | Status: SHIPPED | OUTPATIENT
Start: 2024-09-03

## 2024-10-01 DIAGNOSIS — K21.9 GASTROESOPHAGEAL REFLUX DISEASE WITHOUT ESOPHAGITIS: ICD-10-CM

## 2024-10-01 NOTE — TELEPHONE ENCOUNTER
"Request for medication refill:  omeprazole (PRILOSEC) 20 MG DR capsule   Providers if patient needs an appointment and you are willing to give a one month supply please refill for one month and  send a letter/MyChart using \".SMILLIMITEDREFILL\" .smillimited and route chart to \"P [facility] \" (Giving one month refill in non controlled medications is strongly recommended before denial)    If refill has been denied, meaning absolutely no refills without visit, please complete the smart phrase \".smirxrefuse\" and route it to the \"[facility] MED REFILLS\"  pool to inform the patient and the pharmacy.    Lilly Beauchamp     "

## 2024-11-03 ENCOUNTER — HEALTH MAINTENANCE LETTER (OUTPATIENT)
Age: 69
End: 2024-11-03

## 2024-12-17 DIAGNOSIS — Z79.4 TYPE 2 DIABETES MELLITUS WITHOUT COMPLICATION, WITH LONG-TERM CURRENT USE OF INSULIN (H): ICD-10-CM

## 2024-12-17 DIAGNOSIS — E11.9 TYPE 2 DIABETES MELLITUS WITHOUT COMPLICATION, WITH LONG-TERM CURRENT USE OF INSULIN (H): ICD-10-CM

## 2024-12-17 RX ORDER — PEN NEEDLE, DIABETIC 29 G X1/2"
NEEDLE, DISPOSABLE MISCELLANEOUS
Qty: 100 EACH | Refills: 0 | OUTPATIENT
Start: 2024-12-17

## 2025-03-01 ENCOUNTER — HEALTH MAINTENANCE LETTER (OUTPATIENT)
Age: 70
End: 2025-03-01

## 2025-03-31 DIAGNOSIS — E11.9 TYPE 2 DIABETES MELLITUS WITHOUT COMPLICATION, WITH LONG-TERM CURRENT USE OF INSULIN (H): ICD-10-CM

## 2025-03-31 DIAGNOSIS — I10 ESSENTIAL HYPERTENSION: ICD-10-CM

## 2025-03-31 DIAGNOSIS — Z79.4 TYPE 2 DIABETES MELLITUS WITHOUT COMPLICATION, WITH LONG-TERM CURRENT USE OF INSULIN (H): ICD-10-CM

## 2025-04-01 DIAGNOSIS — E11.9 TYPE 2 DIABETES MELLITUS WITHOUT COMPLICATION, WITH LONG-TERM CURRENT USE OF INSULIN (H): ICD-10-CM

## 2025-04-01 DIAGNOSIS — Z79.4 TYPE 2 DIABETES MELLITUS WITHOUT COMPLICATION, WITH LONG-TERM CURRENT USE OF INSULIN (H): ICD-10-CM

## 2025-04-01 RX ORDER — ATORVASTATIN CALCIUM 40 MG/1
40 TABLET, FILM COATED ORAL
Qty: 30 TABLET | Refills: 11 | Status: SHIPPED | OUTPATIENT
Start: 2025-04-01

## 2025-04-01 RX ORDER — AMLODIPINE BESYLATE 10 MG/1
10 TABLET ORAL EVERY MORNING
Qty: 30 TABLET | Refills: 11 | Status: SHIPPED | OUTPATIENT
Start: 2025-04-01

## 2025-04-01 NOTE — TELEPHONE ENCOUNTER
"Request for medication refill:    amLODIPine (NORVASC) 10 MG tablet edication Name:     atorvastatin (LIPITOR) 40 MG tablet     Providers if patient needs an appointment and you are willing to give a one month supply please refill for one month and  send a MyChart using \".SMILLIMITEDREFILL\" .Or route chart to \"P [facility] \" . To call patient and inform of limited refill and providers request to make an appointment. (Giving one month refill in non controlled medications is strongly recommended before denial)    If refill has been denied, meaning absolutely no refills without visit, please complete the smart phrase \".SMIRXREFUSE\" and route it to the \"P [facility] MED REFILLS\"  pool to inform the patient and the pharmacy.    Darling Dillon RN      "

## 2025-04-02 RX ORDER — INSULIN GLARGINE 100 [IU]/ML
INJECTION, SOLUTION SUBCUTANEOUS
Qty: 15 ML | Refills: 0 | Status: SHIPPED | OUTPATIENT
Start: 2025-04-02

## 2025-05-28 DIAGNOSIS — Z79.4 TYPE 2 DIABETES MELLITUS WITHOUT COMPLICATION, WITH LONG-TERM CURRENT USE OF INSULIN (H): ICD-10-CM

## 2025-05-28 DIAGNOSIS — E11.9 TYPE 2 DIABETES MELLITUS WITHOUT COMPLICATION, WITH LONG-TERM CURRENT USE OF INSULIN (H): ICD-10-CM

## 2025-05-28 RX ORDER — INSULIN GLARGINE 100 [IU]/ML
INJECTION, SOLUTION SUBCUTANEOUS
Qty: 15 ML | Refills: 0 | Status: SHIPPED | OUTPATIENT
Start: 2025-05-28

## 2025-06-10 ENCOUNTER — PATIENT OUTREACH (OUTPATIENT)
Dept: CARE COORDINATION | Facility: CLINIC | Age: 70
End: 2025-06-10
Payer: COMMERCIAL

## 2025-06-16 DIAGNOSIS — Z79.4 TYPE 2 DIABETES MELLITUS WITHOUT COMPLICATION, WITH LONG-TERM CURRENT USE OF INSULIN (H): ICD-10-CM

## 2025-06-16 DIAGNOSIS — E11.9 TYPE 2 DIABETES MELLITUS WITHOUT COMPLICATION, WITH LONG-TERM CURRENT USE OF INSULIN (H): ICD-10-CM

## 2025-06-16 RX ORDER — PEN NEEDLE, DIABETIC 29 G X1/2"
NEEDLE, DISPOSABLE MISCELLANEOUS
Qty: 100 EACH | Refills: 0 | Status: SHIPPED | OUTPATIENT
Start: 2025-06-16 | End: 2025-06-18

## 2025-06-18 ENCOUNTER — OFFICE VISIT (OUTPATIENT)
Dept: FAMILY MEDICINE | Facility: CLINIC | Age: 70
End: 2025-06-18
Payer: MEDICARE

## 2025-06-18 VITALS
OXYGEN SATURATION: 94 % | WEIGHT: 195.6 LBS | RESPIRATION RATE: 16 BRPM | HEIGHT: 73 IN | SYSTOLIC BLOOD PRESSURE: 136 MMHG | TEMPERATURE: 98.6 F | DIASTOLIC BLOOD PRESSURE: 70 MMHG | BODY MASS INDEX: 25.92 KG/M2 | HEART RATE: 87 BPM

## 2025-06-18 DIAGNOSIS — Z79.4 TYPE 2 DIABETES MELLITUS WITHOUT COMPLICATION, WITH LONG-TERM CURRENT USE OF INSULIN (H): ICD-10-CM

## 2025-06-18 DIAGNOSIS — L97.511 SKIN ULCER OF TOE OF RIGHT FOOT, LIMITED TO BREAKDOWN OF SKIN (H): Primary | ICD-10-CM

## 2025-06-18 DIAGNOSIS — Z13.6 SCREENING FOR AAA (ABDOMINAL AORTIC ANEURYSM): ICD-10-CM

## 2025-06-18 DIAGNOSIS — B35.1 ONYCHOMYCOSIS: ICD-10-CM

## 2025-06-18 DIAGNOSIS — Z87.891 HISTORY OF TOBACCO USE, PRESENTING HAZARDS TO HEALTH: ICD-10-CM

## 2025-06-18 DIAGNOSIS — E11.9 TYPE 2 DIABETES MELLITUS WITHOUT COMPLICATION, WITH LONG-TERM CURRENT USE OF INSULIN (H): ICD-10-CM

## 2025-06-18 LAB
ALBUMIN SERPL BCG-MCNC: 4.7 G/DL (ref 3.5–5.2)
ALP SERPL-CCNC: 178 U/L (ref 40–150)
ALT SERPL W P-5'-P-CCNC: 27 U/L (ref 0–70)
ANION GAP SERPL CALCULATED.3IONS-SCNC: 12 MMOL/L (ref 7–15)
AST SERPL W P-5'-P-CCNC: 28 U/L (ref 0–45)
BILIRUB SERPL-MCNC: 0.4 MG/DL
BUN SERPL-MCNC: 12.3 MG/DL (ref 8–23)
CALCIUM SERPL-MCNC: 9.6 MG/DL (ref 8.8–10.4)
CHLORIDE SERPL-SCNC: 105 MMOL/L (ref 98–107)
CHOLEST SERPL-MCNC: 99 MG/DL
CREAT SERPL-MCNC: 0.93 MG/DL (ref 0.67–1.17)
EGFRCR SERPLBLD CKD-EPI 2021: 90 ML/MIN/1.73M2
ERYTHROCYTE [DISTWIDTH] IN BLOOD BY AUTOMATED COUNT: 13.3 % (ref 10–15)
EST. AVERAGE GLUCOSE BLD GHB EST-MCNC: 171 MG/DL
FASTING STATUS PATIENT QL REPORTED: YES
FASTING STATUS PATIENT QL REPORTED: YES
GLUCOSE SERPL-MCNC: 176 MG/DL (ref 70–99)
HBA1C MFR BLD: 7.6 % (ref 0–5.6)
HCO3 SERPL-SCNC: 27 MMOL/L (ref 22–29)
HCT VFR BLD AUTO: 45 % (ref 40–53)
HDLC SERPL-MCNC: 21 MG/DL
HGB BLD-MCNC: 14.5 G/DL (ref 13.3–17.7)
LDLC SERPL CALC-MCNC: 50 MG/DL
MCH RBC QN AUTO: 28.7 PG (ref 26.5–33)
MCHC RBC AUTO-ENTMCNC: 32.2 G/DL (ref 31.5–36.5)
MCV RBC AUTO: 89 FL (ref 78–100)
NONHDLC SERPL-MCNC: 78 MG/DL
PLATELET # BLD AUTO: 146 10E3/UL (ref 150–450)
POTASSIUM SERPL-SCNC: 4.7 MMOL/L (ref 3.4–5.3)
PROT SERPL-MCNC: 7.7 G/DL (ref 6.4–8.3)
RBC # BLD AUTO: 5.06 10E6/UL (ref 4.4–5.9)
SODIUM SERPL-SCNC: 144 MMOL/L (ref 135–145)
TRIGL SERPL-MCNC: 142 MG/DL
TSH SERPL DL<=0.005 MIU/L-ACNC: 3.37 UIU/ML (ref 0.3–4.2)
VIT B12 SERPL-MCNC: 456 PG/ML (ref 232–1245)
WBC # BLD AUTO: 5.3 10E3/UL (ref 4–11)

## 2025-06-18 PROCEDURE — 84443 ASSAY THYROID STIM HORMONE: CPT | Performed by: FAMILY MEDICINE

## 2025-06-18 PROCEDURE — 85027 COMPLETE CBC AUTOMATED: CPT | Performed by: FAMILY MEDICINE

## 2025-06-18 PROCEDURE — 80061 LIPID PANEL: CPT | Performed by: FAMILY MEDICINE

## 2025-06-18 PROCEDURE — G2211 COMPLEX E/M VISIT ADD ON: HCPCS | Performed by: FAMILY MEDICINE

## 2025-06-18 PROCEDURE — 80053 COMPREHEN METABOLIC PANEL: CPT | Performed by: FAMILY MEDICINE

## 2025-06-18 PROCEDURE — 3075F SYST BP GE 130 - 139MM HG: CPT | Performed by: FAMILY MEDICINE

## 2025-06-18 PROCEDURE — 3078F DIAST BP <80 MM HG: CPT | Performed by: FAMILY MEDICINE

## 2025-06-18 PROCEDURE — 1126F AMNT PAIN NOTED NONE PRSNT: CPT | Performed by: FAMILY MEDICINE

## 2025-06-18 PROCEDURE — 3051F HG A1C>EQUAL 7.0%<8.0%: CPT | Performed by: FAMILY MEDICINE

## 2025-06-18 PROCEDURE — 82607 VITAMIN B-12: CPT | Performed by: FAMILY MEDICINE

## 2025-06-18 PROCEDURE — 99214 OFFICE O/P EST MOD 30 MIN: CPT | Performed by: FAMILY MEDICINE

## 2025-06-18 PROCEDURE — 36415 COLL VENOUS BLD VENIPUNCTURE: CPT | Performed by: FAMILY MEDICINE

## 2025-06-18 PROCEDURE — 83036 HEMOGLOBIN GLYCOSYLATED A1C: CPT | Performed by: FAMILY MEDICINE

## 2025-06-18 RX ORDER — GABAPENTIN 300 MG/1
300 CAPSULE ORAL
COMMUNITY
Start: 2024-08-06 | End: 2025-08-06

## 2025-06-18 RX ORDER — INSULIN GLARGINE 100 [IU]/ML
INJECTION, SOLUTION SUBCUTANEOUS
Qty: 30 ML | Refills: 1 | Status: SHIPPED | OUTPATIENT
Start: 2025-06-18

## 2025-06-18 RX ORDER — CICLOPIROX 80 MG/ML
SOLUTION TOPICAL
Qty: 6.6 ML | Refills: 5 | Status: SHIPPED | OUTPATIENT
Start: 2025-06-18

## 2025-06-18 RX ORDER — CICLOPIROX 80 MG/ML
SOLUTION TOPICAL
Qty: 6.6 ML | Refills: 5 | Status: SHIPPED | OUTPATIENT
Start: 2025-06-18 | End: 2025-06-18

## 2025-06-18 RX ORDER — PEN NEEDLE, DIABETIC 29 G X1/2"
NEEDLE, DISPOSABLE MISCELLANEOUS
Qty: 100 EACH | Refills: 3 | Status: SHIPPED | OUTPATIENT
Start: 2025-06-18

## 2025-06-18 ASSESSMENT — PAIN SCALES - GENERAL: PAINLEVEL_OUTOF10: NO PAIN (0)

## 2025-06-18 NOTE — PATIENT INSTRUCTIONS
Right 2nd toe ulceration. Due to diabetes - need to see foot doctor. I put a referral in. Please call them at [phone] to schedule an appointment.     Follow up in 6 months for wellness visit and diabetes visit.     I will notify you once I get your results. Blood sugars are slightly worse than last time. Keep monitoring it once per day.     I sent antifungal cream to [facility].    Insulin to [facility].     Follow up with neurologist for seizures.     Work on quitting smoking.     Qualifies for lung cancer and aneurysm screening - obtain it by calling [phone]     Complete cologuard test.         =======================================  FYI:     System will autorelease results as soon as they are available. I usually wait for all results to be ready before sending you a comment or message.  Be assured I will review and comment on all of your results as soon as I can.     I am at [facility]  ([phone]) usually Monday, Tuesday and Wednesday.   Messages, evisits, phone calls received on Thursday and Friday may not get responded very urgently but I will try to respond as soon as possible.     You can schedule a video visit through this link Video Visits ([email or web address])     Sign up for [facility] ([email or web address]).  This will allow you to review your results, securely communicate with a provider, and schedule virtual visits as well.

## 2025-06-18 NOTE — PROGRESS NOTES
Assessment & Plan     Skin ulcer of toe of right foot, limited to breakdown of skin (H)  Right second toe is hammertoe with superficial ulceration of the toe and some hypertrophy.  Impaired sensation of his feet and I am concerned about further worsening.  Should see podiatry.  Referral given.  written instruction given for group home.  - Orthopedic  Referral; Future    Type 2 diabetes mellitus without complication, with long-term current use of insulin (H)  Insulin-dependent.  Mild worsening of A1c compared to last time.  We agreed not to make any changes.  Stick to the same dose of Lantus.  - Hemoglobin A1c; Future  - Lipid panel reflex to direct LDL Fasting; Future  - Comprehensive metabolic panel; Future  - Albumin Random Urine Quantitative with Creat Ratio; Future  - CBC with platelets; Future  - Vitamin B12; Future  - TSH with free T4 reflex; Future  - Hemoglobin A1c  - Lipid panel reflex to direct LDL Fasting  - Comprehensive metabolic panel  - Albumin Random Urine Quantitative with Creat Ratio  - CBC with platelets  - Vitamin B12  - TSH with free T4 reflex  - insulin glargine (LANTUS SOLOSTAR) 100 UNIT/ML pen; INJECT 28 UNITS SUBCUTANEOUSLY ONCE DAILY *DISCARD PEN 28 DAYS AFTER FIRST USE, DO NOT REFRIGERATE AFTER FIRST USE*  - insulin pen needle (ULTICARE PEN NEEDLES) 31G X 5 MM miscellaneous; USE 1 PEN NEEDLE DAILY OR AS DIRECTED.      Screening for AAA (abdominal aortic aneurysm)     - US Abdominal Aorta; Future    History of tobacco use, presenting hazards to health  Ongoing smoking.  Not interested in quitting.  - CT Chest Lung Cancer Screen Low Dose Without; Future    Onychomycosis  Right second toe.  We discussed oral medications.  He feels topical would be adequate.  We discussed limited efficacy.  He would like to still try topical.  Side effects discussed.  - ciclopirox (PENLAC) 8 % external solution; Apply to adjacent skin and affected nails daily.  Remove with alcohol every 7 days, then  "repeat.          Nicotine/Tobacco Cessation  He reports that he has been smoking cigarettes. He has a 20 pack-year smoking history. He has never used smokeless tobacco.  Nicotine/Tobacco Cessation Plan  Information offered: Patient not interested at this time  Self help information given to patient      BMI  Estimated body mass index is 26.16 kg/m  as calculated from the following:    Height as of this encounter: 1.842 m (6' 0.5\").    Weight as of this encounter: 88.7 kg (195 lb 9.6 oz).         The longitudinal plan of care for the diagnosis(es)/condition(s) as documented were addressed during this visit. Due to the added complexity in care, I will continue to support Karlo in the subsequent management and with ongoing continuity of care.    Subjective   Karlo is a 69 year old, presenting for the following health issues:  Diabetes and Recheck Medication      6/18/2025    10:58 AM   Additional Questions   Roomed by Saritha GUPTA     History of Present Illness       Diabetes:   He presents for follow up of diabetes.  He is checking home blood glucose one time daily.   He checks blood glucose before meals.  Blood glucose is never over 200 and never under 70.  When his blood glucose is low, the patient is asymptomatic for confusion, blurred vision, lethargy and reports not feeling dizzy, shaky, or weak.   He has no concerns regarding his diabetes at this time.  He is having numbness in feet, burning in feet and none of these symptoms. He is not experiencing numbness or burning in feet, excessive thirst, blurry vision, weight changes or redness, sores or blisters on feet. The patient has not had a diabetic eye exam in the last 12 months.          He eats 2-3 servings of fruits and vegetables daily.He consumes 2 sweetened beverage(s) daily.He exercises with enough effort to increase his heart rate 20 to 29 minutes per day.  He exercises with enough effort to increase his heart rate 7 days per week.   He is taking " "medications regularly.      Stubbed toe and big toe nail is loose.     Has toe nail fungus and wishes to try topical med which worked well in the past.     Checking blood sugars daily. 135 average at home. No low blood sugars.   28 units of lantus per day.     Smoking - cutting back on it. Walking around 2 days per week. Some gardening too.   Could not leave a urine specimen.     Been to neurologist. No new seizures.     Cologuard at home. Going to complete it.         Objective    /70 (BP Location: Right arm, Patient Position: Sitting, Cuff Size: Adult Regular)   Pulse 87   Temp 98.6  F (37  C) (Temporal)   Resp 16   Ht 1.842 m (6' 0.5\")   Wt 88.7 kg (195 lb 9.6 oz)   SpO2 94%   BMI 26.16 kg/m    Body mass index is 26.16 kg/m .  Physical Exam      right 2nd toe - hammer toe and mild ulceration, impaired sensation both lower limbs.            Signed Electronically by: Reji Nguyen MD, MD    "

## 2025-06-19 ENCOUNTER — RESULTS FOLLOW-UP (OUTPATIENT)
Dept: FAMILY MEDICINE | Facility: CLINIC | Age: 70
End: 2025-06-19

## 2025-06-19 ENCOUNTER — PATIENT OUTREACH (OUTPATIENT)
Dept: CARE COORDINATION | Facility: CLINIC | Age: 70
End: 2025-06-19
Payer: MEDICARE

## 2025-06-23 ENCOUNTER — PATIENT OUTREACH (OUTPATIENT)
Dept: CARE COORDINATION | Facility: CLINIC | Age: 70
End: 2025-06-23
Payer: MEDICARE

## 2025-06-24 ENCOUNTER — PATIENT OUTREACH (OUTPATIENT)
Dept: CARE COORDINATION | Facility: CLINIC | Age: 70
End: 2025-06-24
Payer: MEDICARE

## 2025-07-24 ENCOUNTER — TELEPHONE (OUTPATIENT)
Dept: FAMILY MEDICINE | Facility: CLINIC | Age: 70
End: 2025-07-24
Payer: COMMERCIAL

## 2025-07-29 ENCOUNTER — PATIENT OUTREACH (OUTPATIENT)
Dept: CARE COORDINATION | Facility: CLINIC | Age: 70
End: 2025-07-29
Payer: COMMERCIAL

## 2025-08-05 ENCOUNTER — ANCILLARY PROCEDURE (OUTPATIENT)
Dept: ULTRASOUND IMAGING | Facility: CLINIC | Age: 70
End: 2025-08-05
Attending: FAMILY MEDICINE
Payer: COMMERCIAL

## 2025-08-05 ENCOUNTER — ANCILLARY PROCEDURE (OUTPATIENT)
Dept: CT IMAGING | Facility: CLINIC | Age: 70
End: 2025-08-05
Attending: FAMILY MEDICINE
Payer: COMMERCIAL

## 2025-08-05 ENCOUNTER — TELEPHONE (OUTPATIENT)
Dept: FAMILY MEDICINE | Facility: CLINIC | Age: 70
End: 2025-08-05

## 2025-08-05 DIAGNOSIS — Z13.6 SCREENING FOR AAA (ABDOMINAL AORTIC ANEURYSM): ICD-10-CM

## 2025-08-05 DIAGNOSIS — Z87.891 HISTORY OF TOBACCO USE, PRESENTING HAZARDS TO HEALTH: ICD-10-CM

## 2025-08-05 PROCEDURE — 71271 CT THORAX LUNG CANCER SCR C-: CPT | Performed by: RADIOLOGY

## 2025-08-05 PROCEDURE — 76775 US EXAM ABDO BACK WALL LIM: CPT | Performed by: RADIOLOGY

## 2025-08-23 ENCOUNTER — HEALTH MAINTENANCE LETTER (OUTPATIENT)
Age: 70
End: 2025-08-23

## (undated) DEVICE — LUBRICANT INST KIT ENDO-LUBE 220-90

## (undated) DEVICE — SYR 20ML SLIP TIP W/O NDL 302831

## (undated) DEVICE — SYR 10ML SLIP TIP W/O NDL 303134

## (undated) DEVICE — ENDO NDL BX ASPIRATION EBUS 21GA VIZISHOT NA-201SX-4021

## (undated) DEVICE — ENDO NDL ULTRASOUND 19GA EXPECT FLEX M00550040

## (undated) DEVICE — ENDO VALVE BX EVIS MAJ-210

## (undated) DEVICE — ENDO VALVE SYR NDL KIT ULTRASOUND BRONCH NA-201SX-4022-A

## (undated) DEVICE — SUCTION MANIFOLD NEPTUNE 2 SYS 4 PORT 0702-020-000

## (undated) DEVICE — KIT ENDO FIRST STEP DISINFECTANT 200ML W/POUCH EP-4

## (undated) DEVICE — ENDO VALVE SUCTION ULTRASOUND BRONCH MAJ-1414

## (undated) DEVICE — ENDO ADPT BRONCH SWIVEL Y A1002

## (undated) DEVICE — ENDO ADPT BRONCH SWIVEL PORTEX UNSTERILE

## (undated) DEVICE — TUBING SUCTION 10'X3/16" N510

## (undated) DEVICE — SYR 10ML LL W/O NDL 302995

## (undated) DEVICE — CONNECTOR STOPCOCK 3 WAY MALE LL HI-FLO MX9311L

## (undated) RX ORDER — METOPROLOL TARTRATE 1 MG/ML
INJECTION, SOLUTION INTRAVENOUS
Status: DISPENSED
Start: 2022-12-15

## (undated) RX ORDER — EPHEDRINE SULFATE 50 MG/ML
INJECTION, SOLUTION INTRAMUSCULAR; INTRAVENOUS; SUBCUTANEOUS
Status: DISPENSED
Start: 2022-03-25

## (undated) RX ORDER — FENTANYL CITRATE 50 UG/ML
INJECTION, SOLUTION INTRAMUSCULAR; INTRAVENOUS
Status: DISPENSED
Start: 2022-03-25

## (undated) RX ORDER — PROPOFOL 10 MG/ML
INJECTION, EMULSION INTRAVENOUS
Status: DISPENSED
Start: 2022-03-25

## (undated) RX ORDER — ATROPINE SULFATE 0.4 MG/ML
AMPUL (ML) INJECTION
Status: DISPENSED
Start: 2022-12-15

## (undated) RX ORDER — ROCURONIUM BROMIDE 50 MG/5 ML
SYRINGE (ML) INTRAVENOUS
Status: DISPENSED
Start: 2022-03-25

## (undated) RX ORDER — FENTANYL CITRATE-0.9 % NACL/PF 10 MCG/ML
PLASTIC BAG, INJECTION (ML) INTRAVENOUS
Status: DISPENSED
Start: 2022-03-25

## (undated) RX ORDER — LIDOCAINE HYDROCHLORIDE 20 MG/ML
INJECTION, SOLUTION EPIDURAL; INFILTRATION; INTRACAUDAL; PERINEURAL
Status: DISPENSED
Start: 2022-03-25

## (undated) RX ORDER — IPRATROPIUM BROMIDE AND ALBUTEROL SULFATE 2.5; .5 MG/3ML; MG/3ML
SOLUTION RESPIRATORY (INHALATION)
Status: DISPENSED
Start: 2022-03-25

## (undated) RX ORDER — DOBUTAMINE HYDROCHLORIDE 200 MG/100ML
INJECTION INTRAVENOUS
Status: DISPENSED
Start: 2022-12-15